# Patient Record
Sex: MALE | Race: WHITE | NOT HISPANIC OR LATINO | URBAN - METROPOLITAN AREA
[De-identification: names, ages, dates, MRNs, and addresses within clinical notes are randomized per-mention and may not be internally consistent; named-entity substitution may affect disease eponyms.]

---

## 2019-03-20 ENCOUNTER — INPATIENT (INPATIENT)
Facility: HOSPITAL | Age: 79
LOS: 4 days | Discharge: NURSING, REHAB OR LONG-TERM CARE | End: 2019-03-25
Attending: INTERNAL MEDICINE | Admitting: INTERNAL MEDICINE
Payer: MEDICARE

## 2019-03-20 VITALS
TEMPERATURE: 98 F | DIASTOLIC BLOOD PRESSURE: 98 MMHG | RESPIRATION RATE: 14 BRPM | HEART RATE: 75 BPM | OXYGEN SATURATION: 100 % | SYSTOLIC BLOOD PRESSURE: 141 MMHG

## 2019-03-20 DIAGNOSIS — I62.9 NONTRAUMATIC INTRACRANIAL HEMORRHAGE, UNSPECIFIED: ICD-10-CM

## 2019-03-20 LAB
ALBUMIN SERPL ELPH-MCNC: 3.6 G/DL — SIGNIFICANT CHANGE UP (ref 3.3–5)
ALP SERPL-CCNC: 72 U/L — SIGNIFICANT CHANGE UP (ref 40–120)
ALT FLD-CCNC: 20 U/L — SIGNIFICANT CHANGE UP (ref 4–41)
ANION GAP SERPL CALC-SCNC: 11 MMO/L — SIGNIFICANT CHANGE UP (ref 7–14)
APTT BLD: 28.4 SEC — SIGNIFICANT CHANGE UP (ref 27.5–36.3)
APTT BLD: 38.1 SEC — HIGH (ref 27.5–36.3)
AST SERPL-CCNC: 20 U/L — SIGNIFICANT CHANGE UP (ref 4–40)
BASOPHILS # BLD AUTO: 0.04 K/UL — SIGNIFICANT CHANGE UP (ref 0–0.2)
BASOPHILS NFR BLD AUTO: 0.6 % — SIGNIFICANT CHANGE UP (ref 0–2)
BILIRUB SERPL-MCNC: 0.4 MG/DL — SIGNIFICANT CHANGE UP (ref 0.2–1.2)
BLD GP AB SCN SERPL QL: NEGATIVE — SIGNIFICANT CHANGE UP
BUN SERPL-MCNC: 43 MG/DL — HIGH (ref 7–23)
CALCIUM SERPL-MCNC: 10.1 MG/DL — SIGNIFICANT CHANGE UP (ref 8.4–10.5)
CHLORIDE SERPL-SCNC: 110 MMOL/L — HIGH (ref 98–107)
CO2 SERPL-SCNC: 23 MMOL/L — SIGNIFICANT CHANGE UP (ref 22–31)
CREAT SERPL-MCNC: 3.41 MG/DL — HIGH (ref 0.5–1.3)
EOSINOPHIL # BLD AUTO: 0.13 K/UL — SIGNIFICANT CHANGE UP (ref 0–0.5)
EOSINOPHIL NFR BLD AUTO: 1.9 % — SIGNIFICANT CHANGE UP (ref 0–6)
GLUCOSE SERPL-MCNC: 168 MG/DL — HIGH (ref 70–99)
HCT VFR BLD CALC: 37.5 % — LOW (ref 39–50)
HGB BLD-MCNC: 11.1 G/DL — LOW (ref 13–17)
IMM GRANULOCYTES NFR BLD AUTO: 0.9 % — SIGNIFICANT CHANGE UP (ref 0–1.5)
INR BLD: 1.37 — HIGH (ref 0.88–1.17)
INR BLD: 2.97 — HIGH (ref 0.88–1.17)
LYMPHOCYTES # BLD AUTO: 0.99 K/UL — LOW (ref 1–3.3)
LYMPHOCYTES # BLD AUTO: 14.4 % — SIGNIFICANT CHANGE UP (ref 13–44)
MCHC RBC-ENTMCNC: 21.1 PG — LOW (ref 27–34)
MCHC RBC-ENTMCNC: 29.6 % — LOW (ref 32–36)
MCV RBC AUTO: 71.3 FL — LOW (ref 80–100)
MONOCYTES # BLD AUTO: 0.63 K/UL — SIGNIFICANT CHANGE UP (ref 0–0.9)
MONOCYTES NFR BLD AUTO: 9.2 % — SIGNIFICANT CHANGE UP (ref 2–14)
NEUTROPHILS # BLD AUTO: 5.03 K/UL — SIGNIFICANT CHANGE UP (ref 1.8–7.4)
NEUTROPHILS NFR BLD AUTO: 73 % — SIGNIFICANT CHANGE UP (ref 43–77)
NRBC # FLD: 0 K/UL — LOW (ref 25–125)
PLATELET # BLD AUTO: 140 K/UL — LOW (ref 150–400)
PMV BLD: 11.2 FL — SIGNIFICANT CHANGE UP (ref 7–13)
POTASSIUM SERPL-MCNC: 4.4 MMOL/L — SIGNIFICANT CHANGE UP (ref 3.5–5.3)
POTASSIUM SERPL-SCNC: 4.4 MMOL/L — SIGNIFICANT CHANGE UP (ref 3.5–5.3)
PROT SERPL-MCNC: 6.8 G/DL — SIGNIFICANT CHANGE UP (ref 6–8.3)
PROTHROM AB SERPL-ACNC: 15.4 SEC — HIGH (ref 9.8–13.1)
PROTHROM AB SERPL-ACNC: 35 SEC — HIGH (ref 9.8–13.1)
RBC # BLD: 5.26 M/UL — SIGNIFICANT CHANGE UP (ref 4.2–5.8)
RBC # FLD: 16.8 % — HIGH (ref 10.3–14.5)
RH IG SCN BLD-IMP: POSITIVE — SIGNIFICANT CHANGE UP
SODIUM SERPL-SCNC: 144 MMOL/L — SIGNIFICANT CHANGE UP (ref 135–145)
WBC # BLD: 6.88 K/UL — SIGNIFICANT CHANGE UP (ref 3.8–10.5)
WBC # FLD AUTO: 6.88 K/UL — SIGNIFICANT CHANGE UP (ref 3.8–10.5)

## 2019-03-20 PROCEDURE — 70450 CT HEAD/BRAIN W/O DYE: CPT | Mod: 26

## 2019-03-20 PROCEDURE — 70450 CT HEAD/BRAIN W/O DYE: CPT | Mod: 26,77

## 2019-03-20 RX ORDER — PHYTONADIONE (VIT K1) 5 MG
10 TABLET ORAL ONCE
Qty: 0 | Refills: 0 | Status: COMPLETED | OUTPATIENT
Start: 2019-03-20 | End: 2019-03-20

## 2019-03-20 RX ORDER — DESMOPRESSIN ACETATE 0.1 MG/1
30 TABLET ORAL ONCE
Qty: 0 | Refills: 0 | Status: DISCONTINUED | OUTPATIENT
Start: 2019-03-20 | End: 2019-03-20

## 2019-03-20 RX ORDER — PROTHROMBIN COMPLEX CONCENTRATE (HUMAN) 25.5; 16.5; 24; 22; 22; 26 [IU]/ML; [IU]/ML; [IU]/ML; [IU]/ML; [IU]/ML; [IU]/ML
1500 POWDER, FOR SOLUTION INTRAVENOUS ONCE
Qty: 0 | Refills: 0 | Status: COMPLETED | OUTPATIENT
Start: 2019-03-20 | End: 2019-03-20

## 2019-03-20 RX ORDER — DESMOPRESSIN ACETATE 0.1 MG/1
30 TABLET ORAL ONCE
Qty: 0 | Refills: 0 | Status: COMPLETED | OUTPATIENT
Start: 2019-03-20 | End: 2019-03-20

## 2019-03-20 RX ADMIN — Medication 102 MILLIGRAM(S): at 18:05

## 2019-03-20 RX ADMIN — PROTHROMBIN COMPLEX CONCENTRATE (HUMAN) 400 INTERNATIONAL UNIT(S): 25.5; 16.5; 24; 22; 22; 26 POWDER, FOR SOLUTION INTRAVENOUS at 17:45

## 2019-03-20 RX ADMIN — PROTHROMBIN COMPLEX CONCENTRATE (HUMAN) 1500 INTERNATIONAL UNIT(S): 25.5; 16.5; 24; 22; 22; 26 POWDER, FOR SOLUTION INTRAVENOUS at 18:12

## 2019-03-20 RX ADMIN — DESMOPRESSIN ACETATE 230 MICROGRAM(S): 0.1 TABLET ORAL at 19:31

## 2019-03-20 RX ADMIN — Medication 10 MILLIGRAM(S): at 18:47

## 2019-03-20 NOTE — ED PROVIDER NOTE - CARE PLAN
Principal Discharge DX:	Unilateral weakness Principal Discharge DX:	Intracranial hemorrhage  Secondary Diagnosis:	CAD (coronary artery disease)  Secondary Diagnosis:	Diabetes mellitus type II, controlled

## 2019-03-20 NOTE — ED ADULT TRIAGE NOTE - CHIEF COMPLAINT QUOTE
pt sent by neurologist for worsening symptoms (HA, rt arm/rt leg tremors, difficulty ambulating) secondary to brain bleed from fall March 17, pt on coumadin PMH: DM, afib, CKD, CAD, Vivi cardiac arrest 2016, HLD, TIA, hypothyroidism

## 2019-03-20 NOTE — ED PROVIDER NOTE - CLINICAL SUMMARY MEDICAL DECISION MAKING FREE TEXT BOX
sent for concern for delayed bleed (increased risk 2/2 coumadin) vs tia vs cva (persistent rue weakness). rpt head ct, labs, neuro c/s admission

## 2019-03-20 NOTE — CONSULT NOTE ADULT - SUBJECTIVE AND OBJECTIVE BOX
NEUROSURGERY CONSULT  XAVIER BENNETT   03-20-19 @ 17:59    HPI: 79y Male pmh ckd, cad, mi, stents, hld, tia, afib on coumadin, sent by neurologist (jeremiah bergeron ) for concern for ich. had mechanical fall ~3 days ago, struck back of head, ?loc, went to [place], had neg ct head/cspine, d/c'd home. has had mild constant occipital ha since that time. yesterday (~24 hrs ago) noted acute onset RUE/RLE weakness with inability to ambulate (normally ambulatory +/- cane). RLE weakness resolved, RUE weakness improved throughout day. went to pmd ([facility]) today for symptoms, sent to neurologist who sent to ED for eval.     RADIOLOGY:   CTH Pending read    MEDS:   desmopressin Injectable 30 MICROGram(s) IV Push Once  phytonadione  IVPB 10 milliGRAM(s) IV Intermittent once  prothrombin complex concentrate IVPB (KCENTRA) 1500 International Unit(s) IV Intermittent once      PHYSICAL EXAM:  Vital Signs Last 24 Hrs  T(C): 36.9 (20 Mar 2019 15:46), Max: 36.9 (20 Mar 2019 14:41)  T(F): 98.5 (20 Mar 2019 15:46), Max: 98.5 (20 Mar 2019 14:41)  HR: 98 (20 Mar 2019 15:46) (75 - 98)  BP: 118/65 (20 Mar 2019 15:46) (118/65 - 141/98)  BP(mean): --  RR: 17 (20 Mar 2019 15:46) (14 - 17)  SpO2: 100% (20 Mar 2019 15:46) (100% - 100%)    AAOx3  EOMI, PERRL  PARK x4 with good strength, RUE 4+/5  sensation intact  [facility]    LABS:  [id number] @ 17:59                        11.1   6.88  )-----------( 140      ( 20 Mar 2019 16:00 )             37.5     03-20    144  |  110<H>  |  43<H>  ----------------------------<  168<H>  4.4   |  23  |  3.41<H>    Ca    10.1      20 Mar 2019 16:00    TPro  6.8  /  Alb  3.6  /  TBili  0.4  /  DBili  x   /  AST  20  /  ALT  20  /  AlkPhos  72  03-20    PT/INR - ( 20 Mar 2019 16:00 )   PT: 35.0 SEC;   INR: 2.97          PTT - ( 20 Mar 2019 16:00 )  PTT:38.1 SEC

## 2019-03-20 NOTE — ED ADULT NURSE REASSESSMENT NOTE - NS ED NURSE REASSESS COMMENT FT1
DDAVP begun.  CM in progress.  # beats V Tach observed and notified MD.  Pt asymptomatic.  Will Monitor

## 2019-03-20 NOTE — ED PROVIDER NOTE - CHIEF COMPLAINT
The patient is a 79y Male complaining of The patient is a 79y Male complaining of extremity weakness

## 2019-03-20 NOTE — ED ADULT NURSE NOTE - NSIMPLEMENTINTERV_GEN_ALL_ED
Implemented All Fall with Harm Risk Interventions:  [place] to call system. Call bell, personal items and telephone within reach. Instruct patient to call for assistance. Room bathroom lighting operational. Non-slip footwear when patient is off stretcher. Physically safe environment: no spills, clutter or unnecessary equipment. Stretcher in lowest position, wheels locked, appropriate side rails in place. Provide visual cue, wrist band, yellow gown, etc. Monitor gait and stability. Monitor for mental status changes and reorient to person, place, and time. Review medications for side effects contributing to fall risk. Reinforce activity limits and safety measures with patient and family. Provide visual clues: red socks.

## 2019-03-20 NOTE — CONSULT NOTE ADULT - SUBJECTIVE AND OBJECTIVE BOX
CHIEF COMPLAINT: mechanical fall    HPI: 78 yo M hx HTN HLD DM CKD3 CAD c/b MI and 16 stents, hx TIA in 2013, Afib on coumadin, presents w/ headache in setting of mechanical fall, sent in by neurologist Dr. Marcus.    Patient fell while using walker on sidewalk walking from his neighbor's house back to his home. Witnessed by wife and friend. He felt weak and tired while at friend's house, but him and witnesses say fall was because of bumpy sidewalk. He hit his head on the sidewalk after falling backwards. Immediately after the fall wife says he "blacked out" for 1-2 minutes. They called EMS. Next thing patient remembers is waking up in ambulance asking "where am I?". He denies headache, dizziness, nausea, blurred vision, tinitus, weakness, or any other symptoms immediately after fall. He presented to [facility] where he had CT performed which showed no acute abnormalities. He was observed and then discharged home. He continued to take his coumadin and ASA at home as prescribed and felt well until night of March 19, when he was sitting watching TV and noticed that he could not move his R. arm or R. leg. He could not lift R. leg or even wiggle toe. R,. arm he was able to move hand somewhat. Wife said he was "dead weight" and she had to assist him to bed. At same time he began noticing headache on top of head, different from the soreness in the area that he hit. He went to sleep and woke up a few times during the night to go to bathroom, and was able to ambulate independently. In AM, he still was having weakness as well as tremulousness of his R. upper extremity. He went to see neurologist who referred him to ED for evaluation.       FAMILY HISTORY:  No pertinent family history in first degree relatives      SOCIAL HISTORY:  Smoking: __ packs x ___ years  EtOH Use:  Marital Status:  Occupation:  Recent Travel:  Country of Birth:  Advance Directives:    Allergies    No Known Allergies    Intolerances        HOME MEDICATIONS:    REVIEW OF SYSTEMS:  Constitutional:   Eyes:  ENT:  CV:  Resp:  GI:  :  MSK:  Integumentary:  Neurological:  Psychiatric:  Endocrine:  Hematologic/Lymphatic:  Allergic/Immunologic:  [ ] All other systems negative  [ ] Unable to assess ROS because ________    OBJECTIVE:  ICU Vital Signs Last 24 Hrs  T(C): 36.9 (20 Mar 2019 15:46), Max: 36.9 (20 Mar 2019 14:41)  T(F): 98.5 (20 Mar 2019 15:46), Max: 98.5 (20 Mar 2019 14:41)  HR: 93 (20 Mar 2019 18:02) (75 - 98)  BP: 134/91 (20 Mar 2019 18:02) (118/65 - 141/98)  BP(mean): --  ABP: --  ABP(mean): --  RR: 16 (20 Mar 2019 18:02) (14 - 17)  SpO2: 100% (20 Mar 2019 18:02) (100% - 100%)        CAPILLARY BLOOD GLUCOSE      POCT Blood Glucose.: 172 mg/dL (20 Mar 2019 14:46)      PHYSICAL EXAM:  GENERAL: NAD, well-developed  HEAD:  Atraumatic, Normocephalic  EYES: EOMI, PERRLA, conjunctiva and sclera clear  NECK: Supple, No JVD  CHEST/LUNG: Clear to auscultation bilaterally; No wheeze  HEART: Regular rate and rhythm; No murmurs, rubs, or gallops  ABDOMEN: Soft, Nontender, Nondistended; Bowel sounds present  EXTREMITIES:  2+ Peripheral Pulses, No clubbing, cyanosis, or edema  PSYCH: AAOx3  NEUROLOGY: non-focal  SKIN: No rashes or lesions      HOSPITAL MEDICATIONS:  MEDICATIONS  (STANDING):  desmopressin IVPB 30 MICROGram(s) IV Intermittent Once    MEDICATIONS  (PRN):    LABS:                        11.1   6.88  )-----------( 140      ( 20 Mar 2019 16:00 )             37.5     03-20    144  |  110<H>  |  43<H>  ----------------------------<  168<H>  4.4   |  23  |  3.41<H>    Ca    10.1      20 Mar 2019 16:00    TPro  6.8  /  Alb  3.6  /  TBili  0.4  /  DBili  x   /  AST  20  /  ALT  20  /  AlkPhos  72  03-20    PT/INR - ( 20 Mar 2019 16:00 )   PT: 35.0 SEC;   INR: 2.97          PTT - ( 20 Mar 2019 16:00 )  PTT:38.1 SEC          MICROBIOLOGY:     RADIOLOGY:  [ ] Reviewed and interpreted by me    EKG: CHIEF COMPLAINT: mechanical fall    HPI: 78 yo M hx HTN HLD DM CKD3 CAD c/b MI and 16 stents, hx TIA in 2013, Afib on coumadin, presents w/ headache in setting of mechanical fall, sent in by neurologist Dr. Marcus.    Patient fell while using walker on sidewalk walking from his neighbor's house back to his home. Witnessed by wife and friend. He felt weak and tired while at friend's house, but him and witnesses say fall was because of bumpy sidewalk. He hit his head on the sidewalk after falling backwards. Immediately after the fall wife says he "blacked out" for 1-2 minutes. They called EMS. Next thing patient remembers is waking up in ambulance asking "where am I?". He denies headache, dizziness, nausea, blurred vision, tinitus, weakness, or any other symptoms immediately after fall. He presented to [facility] where he had CT performed which showed no acute abnormalities. He was observed and then discharged home. He continued to take his coumadin and ASA at home as prescribed and felt well until night of March 19, when he was sitting watching TV and noticed that he could not move his R. arm or R. leg. He could not lift R. leg or even wiggle toe. R,. arm he was able to move hand somewhat. Wife said he was "dead weight" and she had to assist him to bed. At same time he began noticing headache on top of head, different from the soreness in the area that he hit. He went to sleep and woke up a few times during the night to go to bathroom, and was able to ambulate independently. In AM, he still was having weakness as well as tremulousness of his R. upper extremity. He went to see neurologist who referred him to ED for evaluation.     In ED ICU Vital Signs Last 24 Hrs  T(C): 36.9 (20 Mar 2019 15:46), Max: 36.9 (20 Mar 2019 14:41)  T(F): 98.5 (20 Mar 2019 15:46), Max: 98.5 (20 Mar 2019 14:41)  HR: 93 (20 Mar 2019 18:02) (75 - 98)  BP: 134/91 (20 Mar 2019 18:02) (118/65 - 141/98)  RR: 16 (20 Mar 2019 18:02) (14 - 17)  SpO2: 100% (20 Mar 2019 18:02) (100% - 100%)    Patient received Kcentra x 1 and vit K 10.    Currently has no complaints; understands reason for ICU evaluation.     REVIEW OF SYSTEMS:    CONSTITUTIONAL: No weakness, fevers or chills  EYES/ENT: No visual changes;  No vertigo or throat pain   NECK: No pain or stiffness  RESPIRATORY: No cough, wheezing, hemoptysis; No shortness of breath  CARDIOVASCULAR: No chest pain or palpitations  GASTROINTESTINAL: No abdominal or epigastric pain. No nausea, vomiting, or hematemesis; No diarrhea or constipation. No melena or hematochezia.  GENITOURINARY: No dysuria, frequency or hematuria  NEUROLOGICAL: No numbness or weakness at present  SKIN: No itching, burning, rashes, or lesions   All other review of systems is negative unless indicated above.    OBJECTIVE:  ICU Vital Signs Last 24 Hrs  T(C): 36.9 (20 Mar 2019 15:46), Max: 36.9 (20 Mar 2019 14:41)  T(F): 98.5 (20 Mar 2019 15:46), Max: 98.5 (20 Mar 2019 14:41)  HR: 93 (20 Mar 2019 18:02) (75 - 98)  BP: 134/91 (20 Mar 2019 18:02) (118/65 - 141/98)  RR: 16 (20 Mar 2019 18:02) (14 - 17)  SpO2: 100% (20 Mar 2019 18:02) (100% - 100%)    POCT Blood Glucose.: 172 mg/dL (20 Mar 2019 14:46)    PHYSICAL EXAM:  GENERAL: elderly well appearing gentleman in NAD, on room air, conversant, pleasant  HEAD:  3 cm superficial appearing abrasion, not actively bleeding on occiput  EYES: EOMI, PERRLA, conjunctiva and sclera clear  NECK: Supple, No JVD  CHEST/LUNG: Clear to auscultation bilaterally; No wheeze  HEART: irregular rhythm; No murmurs, rubs, or gallops  ABDOMEN: Soft, Nontender, Nondistended; Bowel sounds present  EXTREMITIES:  2+ Peripheral Pulses, No clubbing, cyanosis, or edema  PSYCH: AAOx3  NEUROLOGY: cranial nerves in tact, no facial asymmetry, no cognitive deficits noted, motor strength 5/5 in all extremities, sensation to light touch in tact in all extremities  SKIN: No rashes or lesions    HOSPITAL MEDICATIONS:  MEDICATIONS  (STANDING):  desmopressin IVPB 30 MICROGram(s) IV Intermittent Once    LABS:                        11.1   6.88  )-----------( 140      ( 20 Mar 2019 16:00 )             37.5     03-20    144  |  110<H>  |  43<H>  ----------------------------<  168<H>  4.4   |  23  |  3.41<H>    Ca    10.1      20 Mar 2019 16:00    TPro  6.8  /  Alb  3.6  /  TBili  0.4  /  DBili  x   /  AST  20  /  ALT  20  /  AlkPhos  72  03-20    PT/INR - ( 20 Mar 2019 16:00 )   PT: 35.0 SEC;   INR: 2.97        PTT - ( 20 Mar 2019 16:00 )  PTT:38.1 SEC    RADIOLOGY:  [X] Reviewed and interpreted by me: < from: CT Head No Cont (03.20.19 @ 16:58) >  FINDINGS:  There is a 1.5 x 2.0 cm hematoma in the left frontal lobe, with   surrounding vasogenic edema. There is extension of the hemorrhage into   the subarachnoid space.    Additional 1.9 cm x 1.3 cm hematoma in the right temporal occipital   region (2, 12). There is surrounding hypodensity and mild dilatation of   the right temporal horn. This may represent traumatic hemorrhage within a   chronic infarct, but may represent hemorrhage into an underlying lesion.    Mild dilatation of the right temporal horn, which may represent changes   from chronic infarct.     There are chronic white matter microvascular ischemic changes. The   ventricles and sulci are prominent likely related to age-appropriate   involutional changes    The calvarium is intact.     The visualized portions of the paranasal sinuses and mastoid air cells   are clear.    Status post bilateral lens replacement.    Soft tissue swelling overlying the right parietal scalp.    IMPRESSION:   There is a 1.5 x 2.0 cm hematoma in the left frontal lobe, with   surrounding vasogenic edema.  There is extension of the hemorrhage into   the subarachnoid space.  Additional 1.9 cm x 1.3 cm hematoma in the right temporal occipital   region (2, 12) with vasogenic edema.    While findings may be post traumatic in nature, the possibility of   hemorrhage into underlying lesions cannot be noted.     These findings were discussed with Dr. Somers at 3/20/2019 5:03 PM by Dr. Pozo with read back confirmation.    Brain MRI may be done for further evaluation, ifno contraindications   exist.           EKG: CHIEF COMPLAINT: mechanical fall    HPI: 78 yo M hx HTN HLD DM CKD3 CAD c/b MI and 16 stents, hx TIA in 2013, Afib on coumadin, presents w/ headache in setting of mechanical fall, sent in by neurologist Dr. Marcus.    Patient fell while using walker on sidewalk walking from his neighbor's house back to his home. Witnessed by wife and friend. He felt weak and tired while at friend's house, but him and witnesses say fall was because of bumpy sidewalk. He hit his head on the sidewalk after falling backwards. Immediately after the fall wife says he "blacked out" for 1-2 minutes. They called EMS. Next thing patient remembers is waking up in ambulance asking "where am I?". He denies headache, dizziness, nausea, blurred vision, tinitus, weakness, or any other symptoms immediately after fall. He presented to [facility] where he had CT performed which showed no acute abnormalities. He was observed and then discharged home. He continued to take his coumadin and ASA at home as prescribed and felt well until night of March 19, when he was sitting watching TV and noticed that he could not move his R. arm or R. leg. He could not lift R. leg or even wiggle toe. R,. arm he was able to move hand somewhat. Wife said he was "dead weight" and she had to assist him to bed. At same time he began noticing headache on top of head, different from the soreness in the area that he hit. He went to sleep and woke up a few times during the night to go to bathroom, and was able to ambulate independently. In AM, he still was having weakness as well as tremulousness of his R. upper extremity. He went to see neurologist who referred him to ED for evaluation.     In ED ICU Vital Signs Last 24 Hrs  T(C): 36.9 (20 Mar 2019 15:46), Max: 36.9 (20 Mar 2019 14:41)  T(F): 98.5 (20 Mar 2019 15:46), Max: 98.5 (20 Mar 2019 14:41)  HR: 93 (20 Mar 2019 18:02) (75 - 98)  BP: 134/91 (20 Mar 2019 18:02) (118/65 - 141/98)  RR: 16 (20 Mar 2019 18:02) (14 - 17)  SpO2: 100% (20 Mar 2019 18:02) (100% - 100%)    Patient received Kcentra x 1 and vit K 10.    Currently has no complaints; understands reason for ICU evaluation.     REVIEW OF SYSTEMS:    CONSTITUTIONAL: No weakness, fevers or chills  EYES/ENT: No visual changes;  No vertigo or throat pain   NECK: No pain or stiffness  RESPIRATORY: No cough, wheezing, hemoptysis; No shortness of breath  CARDIOVASCULAR: No chest pain or palpitations  GASTROINTESTINAL: No abdominal or epigastric pain. No nausea, vomiting, or hematemesis; No diarrhea or constipation. No melena or hematochezia.  GENITOURINARY: No dysuria, frequency or hematuria  NEUROLOGICAL: No numbness or weakness at present  SKIN: No itching, burning, rashes, or lesions   All other review of systems is negative unless indicated above.    OBJECTIVE:  ICU Vital Signs Last 24 Hrs  T(C): 36.9 (20 Mar 2019 15:46), Max: 36.9 (20 Mar 2019 14:41)  T(F): 98.5 (20 Mar 2019 15:46), Max: 98.5 (20 Mar 2019 14:41)  HR: 93 (20 Mar 2019 18:02) (75 - 98)  BP: 134/91 (20 Mar 2019 18:02) (118/65 - 141/98)  RR: 16 (20 Mar 2019 18:02) (14 - 17)  SpO2: 100% (20 Mar 2019 18:02) (100% - 100%)    POCT Blood Glucose.: 172 mg/dL (20 Mar 2019 14:46)    PHYSICAL EXAM:  GENERAL: elderly well appearing gentleman in NAD, on room air, conversant, pleasant  HEAD:  3 cm superficial appearing abrasion, not actively bleeding on occiput  EYES: EOMI, PERRLA, conjunctiva and sclera clear  NECK: Supple, No JVD  CHEST/LUNG: Clear to auscultation bilaterally; No wheeze  HEART: irregular rhythm; No murmurs, rubs, or gallops  ABDOMEN: Soft, Nontender, Nondistended; Bowel sounds present  EXTREMITIES:  2+ Peripheral Pulses, No clubbing, cyanosis, or edema  PSYCH: AAOx3  NEUROLOGY: cranial nerves in tact, no facial asymmetry, no cognitive deficits noted, motor strength 5/5 in all extremities, sensation to light touch in tact in all extremities  SKIN: No rashes or lesions    HOSPITAL MEDICATIONS:  MEDICATIONS  (STANDING):  desmopressin IVPB 30 MICROGram(s) IV Intermittent Once    LABS:                        11.1   6.88  )-----------( 140      ( 20 Mar 2019 16:00 )             37.5     03-20    144  |  110<H>  |  43<H>  ----------------------------<  168<H>  4.4   |  23  |  3.41<H>    Ca    10.1      20 Mar 2019 16:00    TPro  6.8  /  Alb  3.6  /  TBili  0.4  /  DBili  x   /  AST  20  /  ALT  20  /  AlkPhos  72  03-20    PT/INR - ( 20 Mar 2019 16:00 )   PT: 35.0 SEC;   INR: 2.97        PTT - ( 20 Mar 2019 16:00 )  PTT:38.1 SEC    RADIOLOGY:  [X] Reviewed and interpreted by me: < from: CT Head No Cont (03.20.19 @ 16:58) >  FINDINGS:  There is a 1.5 x 2.0 cm hematoma in the left frontal lobe, with   surrounding vasogenic edema. There is extension of the hemorrhage into   the subarachnoid space.    Additional 1.9 cm x 1.3 cm hematoma in the right temporal occipital   region (2, 12). There is surrounding hypodensity and mild dilatation of   the right temporal horn. This may represent traumatic hemorrhage within a   chronic infarct, but may represent hemorrhage into an underlying lesion.    Mild dilatation of the right temporal horn, which may represent changes   from chronic infarct.     There are chronic white matter microvascular ischemic changes. The   ventricles and sulci are prominent likely related to age-appropriate   involutional changes    The calvarium is intact.     The visualized portions of the paranasal sinuses and mastoid air cells   are clear.    Status post bilateral lens replacement.    Soft tissue swelling overlying the right parietal scalp.    IMPRESSION:   There is a 1.5 x 2.0 cm hematoma in the left frontal lobe, with   surrounding vasogenic edema.  There is extension of the hemorrhage into   the subarachnoid space.  Additional 1.9 cm x 1.3 cm hematoma in the right temporal occipital   region (2, 12) with vasogenic edema.    While findings may be post traumatic in nature, the possibility of   hemorrhage into underlying lesions cannot be noted.     These findings were discussed with Dr. Somers at 3/20/2019 5:03 PM by Dr. Pozo with read back confirmation.    Brain MRI may be done for further evaluation, ifno contraindications   exist.

## 2019-03-20 NOTE — ED ADULT NURSE NOTE - OBJECTIVE STATEMENT
PT received into room 4 A&Ox3 (situationally confused at points) AMB with walker at baseline C/O worsening Headache S/P mechanical fall on 3/17/19; right lower extremity weakness and right upper extremity tremor starting yesterday. Pt states on [place] 3/17 was ambulating with walker, tripped on uneven concrete fell backwards hitting occipital head, experiencing LOC for approx 1 minute had normal CT at that time: went to neurologist for follow up appt today and was refereed to ED 2/2 worsening symptoms. PT currently has decreased sensation to RLE, RLE leg drift " PT states leg feels heavier than Left" new tremor to R UE. No facial asymmetry, no arm drift, no slurred speech. Denies vision changes, N/V. MD at bedside for eval, VS as noted. PMH: DM, afib, CKD, CAD, Vivi cardiac arrest 2016, HLD, TIA, hypothyroidism PT received into room 4 A&Ox3 (situationally confused at points) AMB with walker at baseline C/O worsening Headache S/P mechanical fall on 3/17/19; right lower extremity weakness and right upper extremity tremor starting yesterday. Pt states on [place] 3/17 was ambulating with walker, tripped on uneven concrete fell backwards hitting occipital head, experiencing LOC for approx 1 minute had normal CT at that time: went to neurologist for follow up appt today and was refereed to ED 2/2 worsening symptoms. PT currently has decreased sensation to RLE, RLE leg drift " PT states leg feels heavier than Left" new tremor to R UE. No facial asymmetry, no arm drift, no slurred speech. Denies vision changes, N/V. MD at bedside for eval, VS as noted. PMH: DM, afib, CKD, CAD, Vivi cardiac arrest 2016, HLD, TIA, hypothyroidism. 18 G IV placed to R AC, labs sent. Family at bedside, call bell within reach, comfort measures provided. Will continue to monitor for safety and comfort.

## 2019-03-20 NOTE — ED ADULT NURSE REASSESSMENT NOTE - NS ED NURSE REASSESS COMMENT FT1
returned from break, pt a&ox3, awake and alert. Pt lung sounds clear, pt c/o weakness on right leg, hard to walk as of last night. pt states he had to manually move his leg to get out of bed. pt reports feeling tingling/tremor in right arm. pt has been seen by medical team, currently awaiting head ct

## 2019-03-20 NOTE — ED ADULT NURSE NOTE - PMH
CAD (Coronary Artery Disease)    CHF (Congestive Heart Failure)    Diabetes Mellitus Type II    Hypothyroid

## 2019-03-20 NOTE — ED PROVIDER NOTE - ATTENDING CONTRIBUTION TO CARE
DR. ROSENBAUM, ATTENDING MD-  I performed a face to face bedside interview with patient regarding history of present illness, review of symptoms and past medical history. I completed an independent physical exam.  I have discussed patient's plan of care with the fellow.  Documentation as above in the note.    Yonis: 78 yo male h/o afib, CAD, presents with rt sided weakness/tremors since yesterday.  Sx began abruptly, witnessed by wife who reports no facial asymmetry, no drooling, no dysarthria, but some minor confusion.  Saw his PMD who referred to his neurologist who referred for ER eval and CT.  Patient recently was in ER overnight 3 days ago after having fall striking back of head.  Had two CTs which were reportedly negative and patiewnt was d/c'ed home 2 days ago.    On my exam, well appearing, pleasant, neuro: AAOx4, no facial asymmetry, fluent speech, CN2-12 intact, 5/5 strength throughout LUE/LLE, 4/5 strength RUE, 5/5 RLE.      A/P focal weakness, multiple risk factors for both ICH and embolic CVA.  Sx began yesterday.  Checking CT, labs, EKG, and neuro eval if CT negative for bleed.

## 2019-03-20 NOTE — ED PROVIDER NOTE - OBJECTIVE STATEMENT
pmh ckd, cad, mi, stents, hld, tia, afib on coumadin, sent by neurologist (jeremiah bergeron ) for concern for ich. had mechanical fall ~3 days ago, struck back of head, ?loc, went to [place], had neg ct head/cspine, d/c'd home. has had mild constant occipital ha since that time. yesterday (~24 hrs ago) noted acute onset RUE/RLE weakness with inability to ambulate (normally ambulatory +/- cane). RLE weakness resolved, RUE weakness improved throughout day. went to pmd ([facility]) today for symptoms, sent to neurologist who sent to ED for eval. no cp, no sob, no abd pain, no nvdc, no fevers

## 2019-03-20 NOTE — ED ADULT NURSE REASSESSMENT NOTE - NS ED NURSE REASSESS COMMENT FT1
received report from daytime RN. pt resting comfortably in stretcher, VSS, A-fib on the monitor, awaiting rpt CT scan, will monitor.

## 2019-03-20 NOTE — ED PROVIDER NOTE - PROGRESS NOTE DETAILS
Yonis: notified by raddiology of findings of ICH on CT.  Plan for coumadin reversal with kcentra and Vit K, neurosurg eval, admit Eng: ddavp ordered, pt comfortable, spoke with nsx, to eval nsx note reviewed, requesting micu consult for neuro cx. spoke with micu, to sarwat Resident: discussed case with radiology (Alfredo); no interval change on CTH. Discussed case with neurosurgery PA (Max); no indication for ICU at this time. Will admit to medicine.

## 2019-03-20 NOTE — CONSULT NOTE ADULT - PROBLEM SELECTOR RECOMMENDATION 9
1. give kcentra  2. repeat CTH in 6 hours   3. MICU consult   Case discussed with attending neurosurgeon. 1. give kcentra(Given)  2. repeat CTH in 24 hours   3. Admit to medicine service, ICU not needed, neurologic checks Q4H  4. Stroke neurology consult  Case discussed with attending neurosurgeon.

## 2019-03-20 NOTE — CONSULT NOTE ADULT - ASSESSMENT
80 yo M hx HTN HLD DM CKD3 CAD c/b MI and 16 stents, hx TIA in 2013, Afib on coumadin, presents w/ headache in setting of mechanical fall, sent in by neurologist, concern for head trauma in setting of anticoagulation.    #Intracerebral hematomas  -2 hematomas, with extension into subarachnoid space, as described in CT   -repeat CT head q 6 hours, per neurosurgery  -K centra, vitamin K, DDAVP administered  -please f/u repeat coags. Should completely reverse INR given history of trauma and CT findings  -currently asymptomatic with normal neuro exam, however continue with frequent neuro checks  -hold all AC, ASA    Dispo is still pending at this time. ICU is following along.  Plan d/w Dr. Crystal.    Bennett Fried M.D.  Internal Medicine PGY-2  Pager: [phone]/ PEMA [phone]  After 7 PM on weekdays and 12 PM on weekends page [phone] 78 yo M hx HTN HLD DM CKD3 CAD c/b MI and 16 stents, hx TIA in 2013, Afib on coumadin, presents w/ headache in setting of mechanical fall, sent in by neurologist, concern for head trauma in setting of anticoagulation.    #Intracerebral hematomas  -2 hematomas, with extension into subarachnoid space, as described in CT   -repeat CT head q 6 hours, per neurosurgery  -K centra, vitamin K, DDAVP administered  -please f/u repeat coags. Should completely reverse INR (goal <1.5 in 2 hours) given history of trauma and CT findings  -currently asymptomatic with normal neuro exam, however continue with frequent neuro checks  -hold all AC, ASA    Dispo is still pending at this time. ICU is following along.  Plan d/w Dr. Crystal.    Bennett Fried M.D.  Internal Medicine PGY-2  Pager: [phone]/ PEMA [phone]  After 7 PM on weekdays and 12 PM on weekends page [phone]

## 2019-03-20 NOTE — ED ADULT NURSE NOTE - PSH
History of Appendectomy    History of Rotator Cuff Surgery  right  Stented Coronary Artery  13 stents in place

## 2019-03-20 NOTE — CONSULT NOTE ADULT - ASSESSMENT
Neurologic exam has remained stable. Interval CT is unchanged. There are no neurosurgical needs at this time. Agree with MICU attending that patient no longer requires ICU and is stable for admission to medicine

## 2019-03-21 DIAGNOSIS — I62.9 NONTRAUMATIC INTRACRANIAL HEMORRHAGE, UNSPECIFIED: ICD-10-CM

## 2019-03-21 DIAGNOSIS — E11.65 TYPE 2 DIABETES MELLITUS WITH HYPERGLYCEMIA: ICD-10-CM

## 2019-03-21 DIAGNOSIS — I10 ESSENTIAL (PRIMARY) HYPERTENSION: ICD-10-CM

## 2019-03-21 DIAGNOSIS — N18.4 CHRONIC KIDNEY DISEASE, STAGE 4 (SEVERE): ICD-10-CM

## 2019-03-21 DIAGNOSIS — E03.9 HYPOTHYROIDISM, UNSPECIFIED: ICD-10-CM

## 2019-03-21 DIAGNOSIS — I48.2 CHRONIC ATRIAL FIBRILLATION: ICD-10-CM

## 2019-03-21 DIAGNOSIS — D64.9 ANEMIA, UNSPECIFIED: ICD-10-CM

## 2019-03-21 DIAGNOSIS — E78.5 HYPERLIPIDEMIA, UNSPECIFIED: ICD-10-CM

## 2019-03-21 DIAGNOSIS — Z29.9 ENCOUNTER FOR PROPHYLACTIC MEASURES, UNSPECIFIED: ICD-10-CM

## 2019-03-21 LAB
ANION GAP SERPL CALC-SCNC: 15 MMO/L — HIGH (ref 7–14)
APTT BLD: 21.5 SEC — LOW (ref 27.5–36.3)
BUN SERPL-MCNC: 40 MG/DL — HIGH (ref 7–23)
CALCIUM SERPL-MCNC: 9.7 MG/DL — SIGNIFICANT CHANGE UP (ref 8.4–10.5)
CHLORIDE SERPL-SCNC: 109 MMOL/L — HIGH (ref 98–107)
CHOLEST SERPL-MCNC: 119 MG/DL — LOW (ref 120–199)
CO2 SERPL-SCNC: 19 MMOL/L — LOW (ref 22–31)
CREAT SERPL-MCNC: 3.11 MG/DL — HIGH (ref 0.5–1.3)
FERRITIN SERPL-MCNC: 226.3 NG/ML — SIGNIFICANT CHANGE UP (ref 30–400)
GLUCOSE BLDC GLUCOMTR-MCNC: 114 MG/DL — HIGH (ref 70–99)
GLUCOSE BLDC GLUCOMTR-MCNC: 254 MG/DL — HIGH (ref 70–99)
GLUCOSE BLDC GLUCOMTR-MCNC: 312 MG/DL — HIGH (ref 70–99)
GLUCOSE BLDC GLUCOMTR-MCNC: 360 MG/DL — HIGH (ref 70–99)
GLUCOSE SERPL-MCNC: 107 MG/DL — HIGH (ref 70–99)
HBA1C BLD-MCNC: 7.7 % — HIGH (ref 4–5.6)
HCT VFR BLD CALC: 34.6 % — LOW (ref 39–50)
HDLC SERPL-MCNC: 33 MG/DL — LOW (ref 35–55)
HGB BLD-MCNC: 10.3 G/DL — LOW (ref 13–17)
INR BLD: 1.15 — SIGNIFICANT CHANGE UP (ref 0.88–1.17)
IRON SATN MFR SERPL: 217 UG/DL — SIGNIFICANT CHANGE UP (ref 155–535)
IRON SATN MFR SERPL: 74 UG/DL — SIGNIFICANT CHANGE UP (ref 45–165)
LIPID PNL WITH DIRECT LDL SERPL: 80 MG/DL — SIGNIFICANT CHANGE UP
MAGNESIUM SERPL-MCNC: 1.9 MG/DL — SIGNIFICANT CHANGE UP (ref 1.6–2.6)
MCHC RBC-ENTMCNC: 21.1 PG — LOW (ref 27–34)
MCHC RBC-ENTMCNC: 29.8 % — LOW (ref 32–36)
MCV RBC AUTO: 70.9 FL — LOW (ref 80–100)
NRBC # FLD: 0 K/UL — LOW (ref 25–125)
PHOSPHATE SERPL-MCNC: 2.5 MG/DL — SIGNIFICANT CHANGE UP (ref 2.5–4.5)
PLATELET # BLD AUTO: 151 K/UL — SIGNIFICANT CHANGE UP (ref 150–400)
PMV BLD: 11.5 FL — SIGNIFICANT CHANGE UP (ref 7–13)
POTASSIUM SERPL-MCNC: 4.1 MMOL/L — SIGNIFICANT CHANGE UP (ref 3.5–5.3)
POTASSIUM SERPL-SCNC: 4.1 MMOL/L — SIGNIFICANT CHANGE UP (ref 3.5–5.3)
PROTHROM AB SERPL-ACNC: 13.2 SEC — HIGH (ref 9.8–13.1)
RBC # BLD: 4.88 M/UL — SIGNIFICANT CHANGE UP (ref 4.2–5.8)
RBC # FLD: 16.6 % — HIGH (ref 10.3–14.5)
RETICS #: 77 K/UL — SIGNIFICANT CHANGE UP (ref 25–125)
RETICS/RBC NFR: 1.6 % — SIGNIFICANT CHANGE UP (ref 0.5–2.5)
SODIUM SERPL-SCNC: 143 MMOL/L — SIGNIFICANT CHANGE UP (ref 135–145)
TRIGL SERPL-MCNC: 71 MG/DL — SIGNIFICANT CHANGE UP (ref 10–149)
TSH SERPL-MCNC: 3.2 UIU/ML — SIGNIFICANT CHANGE UP (ref 0.27–4.2)
UIBC SERPL-MCNC: 142.7 UG/DL — SIGNIFICANT CHANGE UP (ref 110–370)
WBC # BLD: 6.32 K/UL — SIGNIFICANT CHANGE UP (ref 3.8–10.5)
WBC # FLD AUTO: 6.32 K/UL — SIGNIFICANT CHANGE UP (ref 3.8–10.5)

## 2019-03-21 PROCEDURE — 70551 MRI BRAIN STEM W/O DYE: CPT | Mod: 26

## 2019-03-21 PROCEDURE — 99222 1ST HOSP IP/OBS MODERATE 55: CPT

## 2019-03-21 PROCEDURE — 92610 EVALUATE SWALLOWING FUNCTION: CPT | Mod: GN

## 2019-03-21 PROCEDURE — 93970 EXTREMITY STUDY: CPT | Mod: 26

## 2019-03-21 RX ORDER — GLUCAGON INJECTION, SOLUTION 0.5 MG/.1ML
1 INJECTION, SOLUTION SUBCUTANEOUS ONCE
Qty: 0 | Refills: 0 | Status: DISCONTINUED | OUTPATIENT
Start: 2019-03-21 | End: 2019-03-25

## 2019-03-21 RX ORDER — SODIUM CHLORIDE 9 MG/ML
1000 INJECTION, SOLUTION INTRAVENOUS
Qty: 0 | Refills: 0 | Status: DISCONTINUED | OUTPATIENT
Start: 2019-03-21 | End: 2019-03-25

## 2019-03-21 RX ORDER — LOSARTAN POTASSIUM 100 MG/1
1 TABLET, FILM COATED ORAL
Qty: 0 | Refills: 0 | COMMUNITY

## 2019-03-21 RX ORDER — DOXAZOSIN MESYLATE 4 MG
2 TABLET ORAL AT BEDTIME
Qty: 0 | Refills: 0 | Status: DISCONTINUED | OUTPATIENT
Start: 2019-03-21 | End: 2019-03-25

## 2019-03-21 RX ORDER — INSULIN LISPRO 100/ML
VIAL (ML) SUBCUTANEOUS AT BEDTIME
Qty: 0 | Refills: 0 | Status: DISCONTINUED | OUTPATIENT
Start: 2019-03-21 | End: 2019-03-25

## 2019-03-21 RX ORDER — INSULIN LISPRO 100/ML
6 VIAL (ML) SUBCUTANEOUS
Qty: 0 | Refills: 0 | Status: DISCONTINUED | OUTPATIENT
Start: 2019-03-21 | End: 2019-03-25

## 2019-03-21 RX ORDER — ATORVASTATIN CALCIUM 80 MG/1
1 TABLET, FILM COATED ORAL
Qty: 0 | Refills: 0 | COMMUNITY

## 2019-03-21 RX ORDER — DEXTROSE 50 % IN WATER 50 %
25 SYRINGE (ML) INTRAVENOUS ONCE
Qty: 0 | Refills: 0 | Status: DISCONTINUED | OUTPATIENT
Start: 2019-03-21 | End: 2019-03-25

## 2019-03-21 RX ORDER — DOXAZOSIN MESYLATE 4 MG
1 TABLET ORAL
Qty: 0 | Refills: 0 | COMMUNITY

## 2019-03-21 RX ORDER — ATORVASTATIN CALCIUM 80 MG/1
20 TABLET, FILM COATED ORAL AT BEDTIME
Qty: 0 | Refills: 0 | Status: DISCONTINUED | OUTPATIENT
Start: 2019-03-21 | End: 2019-03-25

## 2019-03-21 RX ORDER — INSULIN GLARGINE 100 [IU]/ML
12 INJECTION, SOLUTION SUBCUTANEOUS AT BEDTIME
Qty: 0 | Refills: 0 | Status: DISCONTINUED | OUTPATIENT
Start: 2019-03-21 | End: 2019-03-25

## 2019-03-21 RX ORDER — DEXTROSE 50 % IN WATER 50 %
15 SYRINGE (ML) INTRAVENOUS ONCE
Qty: 0 | Refills: 0 | Status: DISCONTINUED | OUTPATIENT
Start: 2019-03-21 | End: 2019-03-25

## 2019-03-21 RX ORDER — DEXTROSE 50 % IN WATER 50 %
12.5 SYRINGE (ML) INTRAVENOUS ONCE
Qty: 0 | Refills: 0 | Status: DISCONTINUED | OUTPATIENT
Start: 2019-03-21 | End: 2019-03-25

## 2019-03-21 RX ORDER — LEVOTHYROXINE SODIUM 125 MCG
1 TABLET ORAL
Qty: 0 | Refills: 0 | COMMUNITY

## 2019-03-21 RX ORDER — INSULIN LISPRO 100/ML
VIAL (ML) SUBCUTANEOUS
Qty: 0 | Refills: 0 | Status: DISCONTINUED | OUTPATIENT
Start: 2019-03-21 | End: 2019-03-25

## 2019-03-21 RX ORDER — CALCITRIOL 0.5 UG/1
0.25 CAPSULE ORAL DAILY
Qty: 0 | Refills: 0 | Status: DISCONTINUED | OUTPATIENT
Start: 2019-03-21 | End: 2019-03-22

## 2019-03-21 RX ORDER — CALCITRIOL 0.5 UG/1
1 CAPSULE ORAL
Qty: 0 | Refills: 0 | COMMUNITY

## 2019-03-21 RX ORDER — INSULIN GLARGINE 100 [IU]/ML
12 INJECTION, SOLUTION SUBCUTANEOUS
Qty: 0 | Refills: 0 | COMMUNITY

## 2019-03-21 RX ORDER — LEVOTHYROXINE SODIUM 125 MCG
112 TABLET ORAL DAILY
Qty: 0 | Refills: 0 | Status: DISCONTINUED | OUTPATIENT
Start: 2019-03-21 | End: 2019-03-25

## 2019-03-21 RX ORDER — CARVEDILOL PHOSPHATE 80 MG/1
1 CAPSULE, EXTENDED RELEASE ORAL
Qty: 0 | Refills: 0 | COMMUNITY

## 2019-03-21 RX ORDER — CARVEDILOL PHOSPHATE 80 MG/1
12.5 CAPSULE, EXTENDED RELEASE ORAL EVERY 12 HOURS
Qty: 0 | Refills: 0 | Status: DISCONTINUED | OUTPATIENT
Start: 2019-03-21 | End: 2019-03-25

## 2019-03-21 RX ADMIN — Medication 5: at 17:54

## 2019-03-21 RX ADMIN — INSULIN GLARGINE 12 UNIT(S): 100 INJECTION, SOLUTION SUBCUTANEOUS at 22:03

## 2019-03-21 RX ADMIN — Medication 2: at 22:04

## 2019-03-21 RX ADMIN — Medication 3: at 13:36

## 2019-03-21 RX ADMIN — CARVEDILOL PHOSPHATE 12.5 MILLIGRAM(S): 80 CAPSULE, EXTENDED RELEASE ORAL at 17:53

## 2019-03-21 RX ADMIN — CALCITRIOL 0.25 MICROGRAM(S): 0.5 CAPSULE ORAL at 18:09

## 2019-03-21 RX ADMIN — Medication 112 MICROGRAM(S): at 06:45

## 2019-03-21 RX ADMIN — ATORVASTATIN CALCIUM 20 MILLIGRAM(S): 80 TABLET, FILM COATED ORAL at 22:04

## 2019-03-21 RX ADMIN — Medication 2 MILLIGRAM(S): at 23:43

## 2019-03-21 RX ADMIN — CARVEDILOL PHOSPHATE 12.5 MILLIGRAM(S): 80 CAPSULE, EXTENDED RELEASE ORAL at 06:45

## 2019-03-21 RX ADMIN — Medication 6 UNIT(S): at 18:09

## 2019-03-21 NOTE — OCCUPATIONAL THERAPY INITIAL EVALUATION ADULT - PERTINENT HX OF CURRENT PROBLEM, REHAB EVAL
78 y/o M with PMH of CKD stage IV, CAD(s/p 16 stents), Afib(on Coumadin), TIA, BPH, Hypothyroidism was sent in by neurologist for RUE and RLE weakness 2/2 to recent fall.

## 2019-03-21 NOTE — H&P ADULT - NEGATIVE OPHTHALMOLOGIC SYMPTOMS
no diplopia/no discharge L/no blurred vision R/no discharge R/no blurred vision L/no lacrimation L/no lacrimation R/no photophobia

## 2019-03-21 NOTE — H&P ADULT - NEGATIVE MUSCULOSKELETAL SYMPTOMS
no arthralgia/no arthritis/no myalgia/no muscle cramps/no neck pain/no stiffness/no joint swelling/no muscle weakness

## 2019-03-21 NOTE — CONSULT NOTE ADULT - ASSESSMENT
80 y/o M who walks with a cane with PMH of CKD stage IV, CAD(s/p 16 stents), Afib(on Coumadin), TIA (in 2013), BPH, Hypothyroidism  presents with RUE and RLE weakness 2/2 to recent fall (3/17/2019). Exam positive for right UE and LE drift with motor strength 4/5 RUE and RLE with no sensory deficit and normal cranial nerves exam. CT head findings suggestive of 1.5 x 2.0 cm hematoma in the left frontal lobe, with surrounding vasogenic edema and extension of  hemorrhage into the subarachnoid space with Additional 1.9 cm x 1.3 cm hematoma in the right temporal occipital region.    Recommendation:    1) D/C warfarin for now  2) Maintain blood pressure less than 140/90 mm Hg   3) MRI Brain without contrast

## 2019-03-21 NOTE — H&P ADULT - GASTROINTESTINAL DETAILS
no distention/no guarding/nontender/normal/bowel sounds normal/soft/no rebound tenderness/no rigidity

## 2019-03-21 NOTE — H&P ADULT - PROBLEM SELECTOR PLAN 5
[id number] Score of 7 and patient on Coumadin for anticoagulation. INR on admission was 2.9 and patient received IV DDAVP and Kcentra due to acute bleed seen on CT head. Repeat INR noted to be 1.31   Continue with Coreg 12.5mg BID for rate control   Will check INR with morning labs

## 2019-03-21 NOTE — H&P ADULT - NSICDXPASTSURGICALHX_GEN_ALL_CORE_FT
PAST SURGICAL HISTORY:  History of Appendectomy     History of Rotator Cuff Surgery right    Stented Coronary Artery 16 stents in place

## 2019-03-21 NOTE — SWALLOW BEDSIDE ASSESSMENT ADULT - COMMENTS
The patient was seen at bedside this afternoon for an initial assessment of swallow function, at which time he was alert and cooperative. Patient denying dysphagia at this time. Per charting, the patient is an 78 y/o Male with a PMHx significant of CKD stage IV, CAD (s/p 16 stents), Afib (on Coumadin), TIA, BPH, Hypothyroidism, was sent in by neurologist for RUE and RLE weakness 2/2 to recent fall. Recent CT of the head revealed, "No interval change. No new hemorrhage or midline shift. Left frontal and right temporal occipital hemorrhages as above." Discussed results and recommendations from this evaluation with the patient, patient's wife, RN, and call out to MD.

## 2019-03-21 NOTE — H&P ADULT - NSICDXPASTMEDICALHX_GEN_ALL_CORE_FT
PAST MEDICAL HISTORY:  Atrial fibrillation     CAD (Coronary Artery Disease) s/p 16 stents    Diabetes Mellitus Type II     Essential hypertension     History of BPH     HLD (hyperlipidemia)     Hypothyroidism

## 2019-03-21 NOTE — H&P ADULT - PROBLEM SELECTOR PLAN 9
Already therapeutic as patient is on Coumadin for Hx of Atrial fibrillation and INR noted to be 2.9 and is now 1.3 s/p DDAVP and Kcentra.

## 2019-03-21 NOTE — H&P ADULT - ATTENDING COMMENTS
78 y/o M who walks with a cane with PMH of CKD stage IV, CAD(s/p 16 stents), Afib(on Coumadin), TIA, BPH, Hypothyroidism was sent in by neurologist for RUE and RLE weakness 2/2 to recent fall, found to have a 1.5 x 2.0 cm hematoma in the left frontal lobe, with surrounding vasogenic edema s/p DDAVp and K centra in ED.    Vital Signs Last 24 Hrs  T(C): 36.7 (21 Mar 2019 10:05), Max: 36.9 (20 Mar 2019 14:41)  T(F): 98.1 (21 Mar 2019 10:05), Max: 98.5 (20 Mar 2019 14:41)  HR: 61 (21 Mar 2019 10:05) (61 - 98)  BP: 139/74 (21 Mar 2019 10:05) (118/65 - 141/98)  BP(mean): --  RR: 18 (21 Mar 2019 10:05) (14 - 19)  SpO2: 97% (21 Mar 2019 10:05) (97% - 100%)    GENERAL: NAD  HEAD:  Atraumatic, Normocephalic  EYES: EOMI, PERRLA, conjunctiva and sclera clear  MOUTH/THROAT: no swelling, no erythema, no lesions, no exudates  NECK: Supple, No JVD, No LAD  CHEST/LUNG: Clear to auscultation bilaterally; No wheezes or rales  HEART: Regular rate and rhythm; nl S1/S2; No murmurs, rubs, or gallops  ABDOMEN: Soft, Nontender, Nondistended; Bowel sounds present  EXTREMITIES:  2+ Peripheral Pulses; No clubbing, cyanosis, or edema  SKIN: No rashes or lesions; No jaundice  NEURO: A+O x 3; nonfocal CN/sensory/reflexes; Rt UE strength is 4/5    LABS:                        10.3   6.32  )-----------( 151      ( 21 Mar 2019 05:50 )             34.6     03-21    143  |  109<H>  |  40<H>  ----------------------------<  107<H>  4.1   |  19<L>  |  3.11<H>    Ca    9.7      21 Mar 2019 05:50  Phos  2.5     03-21  Mg     1.9     03-21    TPro  6.8  /  Alb  3.6  /  TBili  0.4  /  DBili  x   /  AST  20  /  ALT  20  /  AlkPhos  72  03-20    PT/INR - ( 21 Mar 2019 05:50 )   PT: 13.2 SEC;   INR: 1.15          PTT - ( 21 Mar 2019 05:50 )  PTT:21.5 SEC  CAPILLARY BLOOD GLUCOSE      POCT Blood Glucose.: 114 mg/dL (21 Mar 2019 09:14)  POCT Blood Glucose.: 172 mg/dL (20 Mar 2019 14:46)          Plan: Tele, q4h neurochecks, off coumadin.  F/u CT head is pending.  Neurology/neurosurgery appreciated -> requested MRI head.  Continue coreg and lipitor.  Basal/bolus insulin, FS qachs.  Continue synthroid.  Avoid nephrotoxins.  Daily BMP/CBC/coags.  PT/OT.

## 2019-03-21 NOTE — SWALLOW BEDSIDE ASSESSMENT ADULT - SWALLOW EVAL: DIAGNOSIS
1. The patient demonstrated functional oral management of puree, honey thick, nectar thick, and thin liquid textures marked by adequate bolus collection, transfer, and posterior transport. 2. The patient demonstrated a mild oral dysphagia for solids marked by delayed bolus collection, transfer, and posterior transport. Mild lingual residue noted subsequent to deglutition which cleared with a liquid wash. 3. The patient demonstrated a mild pharyngeal dysphagia for puree, solid, honey thick, nectar thick, and thin liquid textures marked by a delayed pharyngeal swallow trigger with reduced hyolaryngeal elevation upon digital palpation w/o evidence of airway penetration.

## 2019-03-21 NOTE — H&P ADULT - PROBLEM SELECTOR PLAN 4
BUN/Cr: 43/3.41 likely 2/2 to underline DM   Will monitor for now   Renal diet ordered   Avoid nephrotoxic medications

## 2019-03-21 NOTE — SWALLOW BEDSIDE ASSESSMENT ADULT - ASR SWALLOW ASPIRATION MONITOR
change of breathing pattern/position upright (90Y)/oral hygiene/fever/pneumonia/throat clearing/gurgly voice/upper respiratory infection/cough

## 2019-03-21 NOTE — H&P ADULT - RS GEN PE MLT RESP DETAILS PC
clear to auscultation bilaterally/respirations non-labored/breath sounds equal/good air movement/normal/airway patent/no intercostal retractions/no rales/no rhonchi/no wheezes/no chest wall tenderness

## 2019-03-21 NOTE — H&P ADULT - NEGATIVE NEUROLOGICAL SYMPTOMS
no transient paralysis/no vertigo/no loss of sensation/no confusion/no focal seizures/no hemiparesis/no tremors/no paresthesias/no generalized seizures

## 2019-03-21 NOTE — H&P ADULT - NEGATIVE ENMT SYMPTOMS
no hearing difficulty/no nasal congestion/no vertigo/no sinus symptoms/no ear pain/no tinnitus/no nasal discharge

## 2019-03-21 NOTE — H&P ADULT - NSHPLABSRESULTS_GEN_ALL_CORE
11.1   6.88  )-----------( 140      ( 20 Mar 2019 16:00 )             37.5     03-20    144  |  110<H>  |  43<H>  ----------------------------<  168<H>  4.4   |  23  |  3.41<H>    Ca    10.1      20 Mar 2019 16:00    TPro  6.8  /  Alb  3.6  /  TBili  0.4  /  DBili  x   /  AST  20  /  ALT  20  /  AlkPhos  72  03-20    CT head: There is a 1.5 x 2.0 cm hematoma in the left frontal lobe, with surrounding vasogenic edema.  There is extension of the hemorrhage into the subarachnoid space. Additional 1.9 cm x 1.3 cm hematoma in the right temporal occipital region (2, 12) with vasogenic edema.  While findings may be post traumatic in nature, the possibility of hemorrhage into underlying lesions cannot be noted.     Repeat CT head: No interval change. No new hemorrhage or midline shift. Left frontal and right temporal occipital hemorrhages as above    EKG: Atrial fibrillation with RVR with PVC, LAD, RBBB at a rate of 102 with QTc of 539

## 2019-03-21 NOTE — H&P ADULT - PROBLEM SELECTOR PLAN 1
Initial CT head revealed " There is a 1.5 x 2.0 cm hematoma in the left frontal lobe, with surrounding vasogenic edema.  There is extension of the hemorrhage into the subarachnoid space. Additional 1.9 cm x 1.3 cm hematoma in the right temporal occipital region (2, 12) with vasogenic edema.  While findings may be post traumatic in nature, the possibility of hemorrhage into underlying lesions cannot be noted". Patient s/p DDAVP and Kcentra for elevated INR   Patient was seen by MICU and Neurosurgery and their recommendation appreciated  Repeat CT head in 6 hours from initial CT revealed "No interval change. No new hemorrhage or midline shift. Left frontal and right temporal occipital hemorrhages as above"  CT head in 24 hours ordered   Seizure/fall/aspiration precautions ordered   Neuro checks q4hrs   PT/OT  Speech and swallow ordered, placed on dysphagia puree diet   Will need to call neurology consult in am

## 2019-03-21 NOTE — H&P ADULT - ASSESSMENT
80 y/o M with PMH of CKD stage IV, CAD(s/p 16 stents), Afib(on Coumadin), TIA, BPH, Hypothyroidism was sent in by neurologist for RUE and RLE weakness 2/2 to recent fall.     +There is a 1.5 x 2.0 cm hematoma in the left frontal lobe, with surrounding vasogenic edema. There is extension of the hemorrhage into the subarachnoid space-being followed by Neurosurgery and MICU

## 2019-03-21 NOTE — CONSULT NOTE ADULT - SUBJECTIVE AND OBJECTIVE BOX
Neurology Consult    HPI:  Following note from h & P with few changes    80 y/o M who walks with a cane with PMH of CKD stage IV, CAD(s/p 16 stents), Afib(on Coumadin), TIA (in 2013), BPH, Hypothyroidism was sent in by neurologist for RUE and RLE weakness 2/2 to recent fall. As per the patient on Sunday his wife was pushing up on the rollator when she went over a bump and patient fell backwards on to the street. Patient had LOC of about few minutes and then ambulance took him to [facility] where CT Head and C-spine was performed and patient was monitored for 1 day and then discharged. Patient stated that when he got home he was unable to move his right arm and leg. Patient stated that he had no strength and this concerned his wife and she decided to bring him to his PCP. Patient stated that the weakness improved throughout the day and after seeing his PCP he was instructed to see the neurologist. When the neurologist examined him, he was told to come to the ER for r/o ICH.    As per as wife he also had 4-5 episodes of shaking right upper and lower extremity lasting for few seconds with no loss of consciousness. he did not have any of those episodes again in last 12 hours.   Patient endorsed of occipital headache, but denied any slurred speech, facial droop, changes in vision, contralateral numbness or weakness. Patient denied any CP, SOB, fevers, chills, N/V/D/C, abdominal pain, dysuria, melena, hematochezia, recent travel, sick contact, pleuritic or positional chest pain.     On ED admission EKG revealed Atrial fibrillation with RVR with PVC, LAD, RBBB at a rate of 102 with QTc of 539, H&H: 11.1/37.5, Plt: 140, BUN/Cr: 43/3.41, Gluc: 168. CT head: There is a 1.5 x 2.0 cm hematoma in the left frontal lobe, with surrounding vasogenic edema.  There is extension of the hemorrhage into the subarachnoid space. Additional 1.9 cm x 1.3 cm hematoma in the right temporal occipital region (2, 12) with vasogenic edema.  While findings may be post traumatic in nature, the possibility of hemorrhage into underlying lesions cannot be noted. Repeat CT head: No interval change. No new hemorrhage or midline shift. Left frontal and right temporal occipital hemorrhages as above. In the ED patient was seen by House neurosurgery and MICU and their recommendations noted. (21 Mar 2019 05:24)    REVIEW OF SYSTEMS:  Constitutional: No fever, chills, fatigue, weakness.  Eyes: No eye pain, visual disturbances, or discharge.  ENT:  No difficulty hearing, tinnitus, vertigo. No sinus or throat pain.  Neck: No pain or stiffness.  Respiratory: No cough, dyspnea, wheezing.  Cardiovascular: No chest pain, palpitations.  Gastrointestinal: No abdominal pain. No nausea, vomiting, diarrhea, or constipation.   Genitourinary: No dysuria, frequency, hematuria or incontinence.  Neurological: No headaches, lightheadedness, vertigo, numbness or tremors. + weakness all over (R>L)   Psychiatric: No depression, anxiety, mood swings, or difficulty sleeping.  Musculoskeletal: No joint pain or swelling. No muscle, back, or extremity pain.  Skin: No itching, burning, rashes or lesions.   Lymph Nodes: No enlarged glands  Endocrine: No heat or cold intolerance, no hair loss.  Allergy and Immunologic: No hives or eczema.    MEDICATIONS  atorvastatin 20 milliGRAM(s) Oral at bedtime  calcitriol   Capsule 0.25 MICROGram(s) Oral daily  carvedilol 12.5 milliGRAM(s) Oral every 12 hours  dextrose 40% Gel 15 Gram(s) Oral once PRN  dextrose 5%. 1000 milliLiter(s) IV Continuous <Continuous>  dextrose 50% Injectable 12.5 Gram(s) IV Push once  dextrose 50% Injectable 25 Gram(s) IV Push once  dextrose 50% Injectable 25 Gram(s) IV Push once  doxazosin 2 milliGRAM(s) Oral at bedtime  glucagon  Injectable 1 milliGRAM(s) IntraMuscular once PRN  insulin glargine Injectable (LANTUS) 12 Unit(s) SubCutaneous at bedtime  insulin lispro (HumaLOG) corrective regimen sliding scale   SubCutaneous three times a day before meals  insulin lispro (HumaLOG) corrective regimen sliding scale   SubCutaneous at bedtime  levothyroxine 112 MICROGram(s) Oral daily      PMH: HLD (hyperlipidemia)  Essential hypertension  Hypothyroidism  Atrial fibrillation  History of BPH  Diabetes Mellitus Type II  DM (Dermatomyositis)  Hypothyroid  CHF (Congestive Heart Failure)  CAD (Coronary Artery Disease)       PSH: History of Appendectomy  History of Rotator Cuff Surgery  Stented Coronary Artery      FAMILY HISTORY:  No pertinent family history in first degree relatives    No history of dementia, strokes, or seizures     SOCIAL HISTORY:  No history of tobacco or alcohol use     Allergies    No Known Allergies    Intolerances    Vital Signs Last 24 Hrs  T(C): 36.8 (21 Mar 2019 05:09), Max: 36.9 (20 Mar 2019 14:41)  T(F): 98.3 (21 Mar 2019 05:09), Max: 98.5 (20 Mar 2019 14:41)  HR: 88 (21 Mar 2019 05:09) (75 - 98)  BP: 140/92 (21 Mar 2019 05:09) (118/65 - 141/98)  BP(mean): --  RR: 19 (21 Mar 2019 05:09) (14 - 19)  SpO2: 100% (21 Mar 2019 05:09) (100% - 100%)    Neurological Examination:    Mental Status: Patient is alert, awake, oriented X3. Patient is fluent, no dysarthria, no aphasia. Follows commands well and able to answer questions appropriately. Mood and affect normal.    Cranial Nerves: PERRL, EOMI, visual field intact, V1-V3 intact, no gross facial asymmetry, tongue/uvula midline    Motor Exam: Right upper and lower extremity drift   Right upper extremity: 4/5  Left upper extremity: 5/5  Right lower extremity: 4/5  Left lower extremity: 5/5    Normal bulk/tone    Sensory: Intact to light touch and pinprick bilaterally. No extinction    Coordination: Finger to nose intact bilaterally     Gait: Normal      Reflexes: Bilateral 2+ Biceps, Brachial, Patellar, Ankle  Plantar: Down bilateral    GENERAL: No acute distress  HEENT:  Normocephalic, atraumatic  EXTREMITIES: No edema, clubbing, cyanosis  MUSCULOSKELETAL: Normal range of motion  SKIN: No rashes    LABS:  CBC Full  -  ( 21 Mar 2019 05:50 )  WBC Count : 6.32 K/uL  Hemoglobin : 10.3 g/dL  Hematocrit : 34.6 %  Platelet Count - Automated : 151 K/uL  Mean Cell Volume : 70.9 fL  Mean Cell Hemoglobin : 21.1 pg  Mean Cell Hemoglobin Concentration : 29.8 %  Auto Neutrophil # : x  Auto Lymphocyte # : x  Auto Monocyte # : x  Auto Eosinophil # : x  Auto Basophil # : x  Auto Neutrophil % : x  Auto Lymphocyte % : x  Auto Monocyte % : x  Auto Eosinophil % : x  Auto Basophil % : x      03-21    143  |  109<H>  |  40<H>  ----------------------------<  107<H>  4.1   |  19<L>  |  3.11<H>    Ca    9.7      21 Mar 2019 05:50  Phos  2.5     03-21  Mg     1.9     03-21    TPro  6.8  /  Alb  3.6  /  TBili  0.4  /  DBili  x   /  AST  20  /  ALT  20  /  AlkPhos  72  03-20    LIVER FUNCTIONS - ( 20 Mar 2019 16:00 )  Alb: 3.6 g/dL / Pro: 6.8 g/dL / ALK PHOS: 72 u/L / ALT: 20 u/L / AST: 20 u/L / GGT: x           Hemoglobin A1C: Hemoglobin A1C, Whole Blood: 7.7 % (03-21 @ 05:50)    Lipid Panel 03-21 @ 05:50  Total Cholesterol, Serum 119  LDL 80  Triglycerides 71      PT/INR - ( 21 Mar 2019 05:50 )   PT: 13.2 SEC;   INR: 1.15          PTT - ( 21 Mar 2019 05:50 )  PTT:21.5 SEC    RADIOLOGY:  CT head:  < from: CT Head No Cont (03.20.19 @ 16:58) >  EXAM:  CT BRAIN        PROCEDURE DATE:  Mar 20 2019         INTERPRETATION:  CLINICAL INFORMATION: Dizziness, Status post fall 3 days   ago, mild constant occipital headache. Acute onset right upper extremity   right lower extremity weakness with inability to ambulate.    TECHNIQUE: Sequential axial images were obtained from the vertex to the   skull base without intravenous contrast. Coronal and sagittal   reformations were obtained.     COMPARISON: CT head 11/9/2013    FINDINGS:  There is a 1.5 x 2.0 cm hematoma in the left frontal lobe, with   surrounding vasogenic edema. There is extension of the hemorrhage into   the subarachnoid space.    Additional 1.9 cm x 1.3 cm hematoma in the right temporal occipital   region (2, 12). There is surrounding hypodensity and mild dilatation of   the right temporal horn. This may represent traumatic hemorrhage within a   chronic infarct, but may represent hemorrhage into an underlying lesion.    Mild dilatation of the right temporal horn, which may represent changes   from chronic infarct.     There are chronic white matter microvascular ischemic changes. The   ventricles and sulci are prominent likely related to age-appropriate   involutional changes    The calvarium is intact.     The visualized portions of the paranasal sinuses and mastoid air cells   are clear.    Status post bilateral lens replacement.    Soft tissue swelling overlying the right parietal scalp.    IMPRESSION:   There is a 1.5 x 2.0 cm hematoma in the left frontal lobe, with   surrounding vasogenic edema.  There is extension of the hemorrhage into   the subarachnoid space.  Additional 1.9 cm x 1.3 cm hematoma in the right temporal occipital   region (2, 12) with vasogenic edema.    While findings may be post traumatic in nature, the possibility of   hemorrhage into underlying lesions cannot be noted.     These findings were discussed with Dr. Somers at 3/20/2019 5:03 PM by Dr. Pozo with read back confirmation.    Brain MRI may be done for further evaluation, ifno contraindications   exist.     < end of copied text >    < from: CT Head No Cont (03.20.19 @ 23:12) >  FINDINGS:   There is the redemonstration of a 2.0 x 1.5 cm left frontal hematoma with   surrounding vasogenic edema. The hemorrhage again extends into the   subarachnoid space, not significantly changed from prior examination.      There is the redemonstration of a right temporal occipital hematoma now   measuring 1.9 x 1.3 cm, unchanged from prior examination with surrounding   vasogenic edema, consistent with a traumatic hemorrhage inside of a   superimposed chronic area of infarction or intralesional hemorrhage.      There is extensive bilateral white matter change consistent with chronic   ischemic disease.    Patient is status post bilateral lens replacement surgery.    There is exuberant soft tissue swelling overlying the right parietal   bone. No fracture is visualized.    IMPRESSION:   No interval change. No new hemorrhage or midline shift. Left frontal and   right temporal occipital hemorrhages as above.    < end of copied text >

## 2019-03-21 NOTE — H&P ADULT - HISTORY OF PRESENT ILLNESS
80 y/o M with PMH of CKD stage IV, CAD(s/p 16 stents), Afib(on Coumadin), TIA, BPH, Hypothyroidism was sent in by neurologist for RUE and RLE weakness 2/2 to recent fall. 80 y/o M who walks with a cane with PMH of CKD stage IV, CAD(s/p 16 stents), Afib(on Coumadin), TIA, BPH, Hypothyroidism was sent in by neurologist for RUE and RLE weakness 2/2 to recent fall. As per the patient on Sunday his wife was pushing up on the rollator when she went over a bump and patient fell backwards on to the street. Patient had LOC of about few minutes and then ambulance took him to [facility] where CT Head and C-spine was performed and patient was monitored for 1 day and then discharged. Patient stated that when he got home he was unable to move his right arm and leg. Patient stated that he had no strength and this concerned his wife and she decided to bring him to his PCP. Patient stated that the weakness improved throughout the day and after seeing his PCP he was instructed to see the neurologist. When the neurologist examined him, he was told to come to the ER for r/o ICH. Patient endorsed of occipital headache, but denied any slurred speech, facial droop, changes in vision, contralateral numbness or weakness. Patient denied any CP, SOB, fevers, chills, N/V/D/C, abdominal pain, dysuria, melena, hematochezia, recent travel, sick contact, pleuritic or positional chest pain.     On ED admission EKG revealed Atrial fibrillation with RVR with PVC, LAD, RBBB at a rate of 102 with QTc of 539, H&H: 11.1/37.5, Plt: 140, BUN/Cr: 43/3.41, Gluc: 168. CT head: There is a 1.5 x 2.0 cm hematoma in the left frontal lobe, with surrounding vasogenic edema.  There is extension of the hemorrhage into the subarachnoid space. Additional 1.9 cm x 1.3 cm hematoma in the right temporal occipital region (2, 12) with vasogenic edema.  While findings may be post traumatic in nature, the possibility of hemorrhage into underlying lesions cannot be noted. Repeat CT head: No interval change. No new hemorrhage or midline shift. Left frontal and right temporal occipital hemorrhages as above. In the ED patient was seen by House neurosurgery and MICU and their recommendations noted.

## 2019-03-21 NOTE — SWALLOW BEDSIDE ASSESSMENT ADULT - SWALLOW EVAL: RECOMMENDED FEEDING/EATING TECHNIQUES
small sips/bites/alternate food with liquid/check mouth frequently for oral residue/pocketing/crush medication (when feasible)/hard swallow w/ each bite or sip/no straws/position upright (90 degrees)/maintain upright posture during/after eating for 30 mins/oral hygiene/allow for swallow between intakes

## 2019-03-22 LAB
ANION GAP SERPL CALC-SCNC: 12 MMO/L — SIGNIFICANT CHANGE UP (ref 7–14)
APTT BLD: 26.8 SEC — LOW (ref 27.5–36.3)
BUN SERPL-MCNC: 43 MG/DL — HIGH (ref 7–23)
CALCIUM SERPL-MCNC: 9.6 MG/DL — SIGNIFICANT CHANGE UP (ref 8.4–10.5)
CHLORIDE SERPL-SCNC: 105 MMOL/L — SIGNIFICANT CHANGE UP (ref 98–107)
CO2 SERPL-SCNC: 23 MMOL/L — SIGNIFICANT CHANGE UP (ref 22–31)
CREAT SERPL-MCNC: 2.98 MG/DL — HIGH (ref 0.5–1.3)
GLUCOSE BLDC GLUCOMTR-MCNC: 230 MG/DL — HIGH (ref 70–99)
GLUCOSE BLDC GLUCOMTR-MCNC: 253 MG/DL — HIGH (ref 70–99)
GLUCOSE BLDC GLUCOMTR-MCNC: 275 MG/DL — HIGH (ref 70–99)
GLUCOSE BLDC GLUCOMTR-MCNC: 296 MG/DL — HIGH (ref 70–99)
GLUCOSE SERPL-MCNC: 206 MG/DL — HIGH (ref 70–99)
HCT VFR BLD CALC: 35 % — LOW (ref 39–50)
HGB BLD-MCNC: 10.3 G/DL — LOW (ref 13–17)
INR BLD: 1.04 — SIGNIFICANT CHANGE UP (ref 0.88–1.17)
MCHC RBC-ENTMCNC: 20.9 PG — LOW (ref 27–34)
MCHC RBC-ENTMCNC: 29.4 % — LOW (ref 32–36)
MCV RBC AUTO: 71 FL — LOW (ref 80–100)
NRBC # FLD: 0 K/UL — LOW (ref 25–125)
PLATELET # BLD AUTO: 142 K/UL — LOW (ref 150–400)
PMV BLD: 11.6 FL — SIGNIFICANT CHANGE UP (ref 7–13)
POTASSIUM SERPL-MCNC: 3.9 MMOL/L — SIGNIFICANT CHANGE UP (ref 3.5–5.3)
POTASSIUM SERPL-SCNC: 3.9 MMOL/L — SIGNIFICANT CHANGE UP (ref 3.5–5.3)
PROTHROM AB SERPL-ACNC: 11.9 SEC — SIGNIFICANT CHANGE UP (ref 9.8–13.1)
RBC # BLD: 4.93 M/UL — SIGNIFICANT CHANGE UP (ref 4.2–5.8)
RBC # FLD: 16.7 % — HIGH (ref 10.3–14.5)
SODIUM SERPL-SCNC: 140 MMOL/L — SIGNIFICANT CHANGE UP (ref 135–145)
WBC # BLD: 6.01 K/UL — SIGNIFICANT CHANGE UP (ref 3.8–10.5)
WBC # FLD AUTO: 6.01 K/UL — SIGNIFICANT CHANGE UP (ref 3.8–10.5)

## 2019-03-22 PROCEDURE — 99222 1ST HOSP IP/OBS MODERATE 55: CPT

## 2019-03-22 PROCEDURE — 99223 1ST HOSP IP/OBS HIGH 75: CPT

## 2019-03-22 PROCEDURE — 70450 CT HEAD/BRAIN W/O DYE: CPT | Mod: 26

## 2019-03-22 RX ORDER — CALCITRIOL 0.5 UG/1
0.25 CAPSULE ORAL
Qty: 0 | Refills: 0 | Status: DISCONTINUED | OUTPATIENT
Start: 2019-03-22 | End: 2019-03-25

## 2019-03-22 RX ORDER — LOSARTAN POTASSIUM 100 MG/1
25 TABLET, FILM COATED ORAL DAILY
Qty: 0 | Refills: 0 | Status: DISCONTINUED | OUTPATIENT
Start: 2019-03-22 | End: 2019-03-25

## 2019-03-22 RX ORDER — ACETAMINOPHEN 500 MG
650 TABLET ORAL EVERY 6 HOURS
Qty: 0 | Refills: 0 | Status: DISCONTINUED | OUTPATIENT
Start: 2019-03-22 | End: 2019-03-25

## 2019-03-22 RX ORDER — CHOLECALCIFEROL (VITAMIN D3) 125 MCG
2000 CAPSULE ORAL DAILY
Qty: 0 | Refills: 0 | Status: DISCONTINUED | OUTPATIENT
Start: 2019-03-22 | End: 2019-03-25

## 2019-03-22 RX ADMIN — Medication 6 UNIT(S): at 18:14

## 2019-03-22 RX ADMIN — Medication 2: at 09:35

## 2019-03-22 RX ADMIN — CARVEDILOL PHOSPHATE 12.5 MILLIGRAM(S): 80 CAPSULE, EXTENDED RELEASE ORAL at 05:34

## 2019-03-22 RX ADMIN — Medication 2000 UNIT(S): at 15:00

## 2019-03-22 RX ADMIN — CARVEDILOL PHOSPHATE 12.5 MILLIGRAM(S): 80 CAPSULE, EXTENDED RELEASE ORAL at 17:31

## 2019-03-22 RX ADMIN — Medication 650 MILLIGRAM(S): at 12:00

## 2019-03-22 RX ADMIN — CALCITRIOL 0.25 MICROGRAM(S): 0.5 CAPSULE ORAL at 15:00

## 2019-03-22 RX ADMIN — LOSARTAN POTASSIUM 25 MILLIGRAM(S): 100 TABLET, FILM COATED ORAL at 14:59

## 2019-03-22 RX ADMIN — Medication 112 MICROGRAM(S): at 05:34

## 2019-03-22 RX ADMIN — Medication 2 MILLIGRAM(S): at 21:45

## 2019-03-22 RX ADMIN — Medication 3: at 18:14

## 2019-03-22 RX ADMIN — Medication 1: at 21:46

## 2019-03-22 RX ADMIN — INSULIN GLARGINE 12 UNIT(S): 100 INJECTION, SOLUTION SUBCUTANEOUS at 21:46

## 2019-03-22 RX ADMIN — Medication 3: at 13:26

## 2019-03-22 RX ADMIN — Medication 6 UNIT(S): at 13:26

## 2019-03-22 RX ADMIN — Medication 650 MILLIGRAM(S): at 11:06

## 2019-03-22 RX ADMIN — Medication 6 UNIT(S): at 09:35

## 2019-03-22 RX ADMIN — ATORVASTATIN CALCIUM 20 MILLIGRAM(S): 80 TABLET, FILM COATED ORAL at 21:45

## 2019-03-22 NOTE — CONSULT NOTE ADULT - CONSULT REASON
traumatic head bleed
Elevated Scr
Evaluate Rehabilitation Needs
Weakness on right
fall with head bleed

## 2019-03-22 NOTE — CONSULT NOTE ADULT - ASSESSMENT
79y Male with history of HTN, DM, ICM presents with RUE weakness found to have R temporal and L frontal hematomas. Nephrology consulted for elevated Scr.    1) CKD-4: Patient with CKD-4 with proteinuria with baseline Scr 3-3.2 for which patient follows with me as an outpatient. Renal function at baseline. Avoid nephrotoxins. Monitor electrolytes.  2) HTN with CKD: BP uncontrolled (goal BP < 140/90 as per neuro). Please restart losartan 25 mg daily. Monitor BP.  3) LE edema: Mild. Plan to resume home lasix 20 mg daily prior to discharge. Monitor UO.  4) Secondary HPT of renal origin: Please change calcitriol to MWF (home schedule) and start cholecalciferol 2000 IU daily. Monitor serum calcium and phosphorus.

## 2019-03-22 NOTE — CONSULT NOTE ADULT - SUBJECTIVE AND OBJECTIVE BOX
[facility] NEPHROLOGY- CONSULTATION NOTE    79y Male with history of below presents with RUE weakness. Nephrology consulted for elevated Scr.    Patient well known to nephrology as follows with me as an outpatient for h/o CKD-4 with baseline Scr 3-3.2. Patient on losartan 25 mg daily and lasix 20 mg daily as an outpatient. Both medications held on current admission.    REVIEW OF SYSTEMS:  Gen: no changes in weight  HEENT: no rhinorrhea  Neck: no sore throat  Cards: no chest pain  Resp: no dyspnea  GI: no nausea or vomiting or diarrhea  : no dysuria or hematuria  Vascular: + LE edema  Derm: no rashes  Neuro: + RUE weaknes    No Known Allergies      Home Medications Reviewed  Hospital Medications:   MEDICATIONS  (STANDING):  atorvastatin 20 milliGRAM(s) Oral at bedtime  calcitriol   Capsule 0.25 MICROGram(s) Oral daily  carvedilol 12.5 milliGRAM(s) Oral every 12 hours  dextrose 5%. 1000 milliLiter(s) (50 mL/Hr) IV Continuous <Continuous>  dextrose 50% Injectable 12.5 Gram(s) IV Push once  dextrose 50% Injectable 25 Gram(s) IV Push once  dextrose 50% Injectable 25 Gram(s) IV Push once  doxazosin 2 milliGRAM(s) Oral at bedtime  insulin glargine Injectable (LANTUS) 12 Unit(s) SubCutaneous at bedtime  insulin lispro (HumaLOG) corrective regimen sliding scale   SubCutaneous three times a day before meals  insulin lispro (HumaLOG) corrective regimen sliding scale   SubCutaneous at bedtime  insulin lispro Injectable (HumaLOG) 6 Unit(s) SubCutaneous three times a day before meals  levothyroxine 112 MICROGram(s) Oral daily      PAST MEDICAL & SURGICAL HISTORY:  HLD (hyperlipidemia)  Essential hypertension  Hypothyroidism  Atrial fibrillation  History of BPH  Diabetes Mellitus Type II  CAD (Coronary Artery Disease): s/p 16 stents  History of Appendectomy  History of Rotator Cuff Surgery: right  Stented Coronary Artery: 16 stents in place      FAMILY HISTORY:  No pertinent family history in first degree relatives      SOCIAL HISTORY:  Denies toxic substance use     VITALS:  T(F): 98 (03-22-19 @ 10:35), Max: 98.8 (03-21-19 @ 21:49)  HR: 82 (03-22-19 @ 10:35)  BP: 157/67 (03-22-19 @ 10:35)  RR: 18 (03-22-19 @ 10:35)  SpO2: 100% (03-22-19 @ 10:35)  Wt(kg): --        PHYSICAL EXAM:  Gen: NAD, calm  HEENT: MMM  Neck: no JVD  Cards: Irregularly irregular, +S1/S2, no M/G/R  Resp: CTA B/L  GI: soft, NT/ND, NABS  : no CVA tenderness  Vascular: trace LE edema B/L  Derm: no rashes  Neuro: + RUE weakness    LABS:  03-22    140  |  105  |  43<H>  ----------------------------<  206<H>  3.9   |  23  |  2.98<H>    Ca    9.6      22 Mar 2019 05:41  Phos  2.5     03-21  Mg     1.9     03-21    TPro  6.8  /  Alb  3.6  /  TBili  0.4  /  DBili      /  AST  20  /  ALT  20  /  AlkPhos  72  03-20    Creatinine Trend: 2.98 <--, 3.11 <--, 3.41 <--                        10.3   6.01  )-----------( 142      ( 22 Mar 2019 05:35 )             35.0     Urine Studies:        RADIOLOGY & ADDITIONAL STUDIES:  < from: CT Head No Cont (03.22.19 @ 09:09) >  IMPRESSION:    The right temporal and left frontal hematomas are stable in size.    < end of copied text >

## 2019-03-22 NOTE — CONSULT NOTE ADULT - SUBJECTIVE AND OBJECTIVE BOX
cc: Gait dysfunction    Admission HPI:  78 y/o M who walks with a cane with PMH of CKD stage IV, CAD(s/p 16 stents), Afib(on Coumadin), TIA, BPH, Hypothyroidism was sent in by neurologist for RUE and RLE weakness 2/2 to recent fall. As per the patient on Sunday his wife was pushing up on the rollator when she went over a bump and patient fell backwards on to the street. Patient had LOC of about few minutes and then ambulance took him to [facility] where CT Head and C-spine was performed and patient was monitored for 1 day and then discharged. Patient stated that when he got home he was unable to move his right arm and leg. Patient stated that he had no strength and this concerned his wife and she decided to bring him to his PCP. Patient stated that the weakness improved throughout the day and after seeing his PCP he was instructed to see the neurologist. When the neurologist examined him, he was told to come to the ER for r/o ICH. Patient endorsed of occipital headache, but denied any slurred speech, facial droop, changes in vision, contralateral numbness or weakness. Patient denied any CP, SOB, fevers, chills, N/V/D/C, abdominal pain, dysuria, melena, hematochezia, recent travel, sick contact, pleuritic or positional chest pain.     On ED admission EKG revealed Atrial fibrillation with RVR with PVC, LAD, RBBB at a rate of 102 with QTc of 539, H&H: 11.1/37.5, Plt: 140, BUN/Cr: 43/3.41, Gluc: 168. CT head: There is a 1.5 x 2.0 cm hematoma in the left frontal lobe, with surrounding vasogenic edema.  There is extension of the hemorrhage into the subarachnoid space. Additional 1.9 cm x 1.3 cm hematoma in the right temporal occipital region (2, 12) with vasogenic edema.  While findings may be post traumatic in nature, the possibility of hemorrhage into underlying lesions cannot be noted. Repeat CT head: No interval change. No new hemorrhage or midline shift. Left frontal and right temporal occipital hemorrhages as above. In the ED patient was seen by House neurosurgery and MICU and their recommendations noted. (21 Mar 2019 05:24)    Interval history:  Patient had MRI which notes hemorrhagic masses of the left frontal and right temporal lobes.   Patient with persistent functional deficits.     REVIEW OF SYSTEMS: No chest pain, shortness of breath, nausea, vomiting or diarhea; other ROS neg     PAST MEDICAL & SURGICAL HISTORY  HLD (hyperlipidemia)  Essential hypertension  Hypothyroidism  Atrial fibrillation  History of BPH  Diabetes Mellitus Type II  DM (Dermatomyositis)  Hypothyroid  CHF (Congestive Heart Failure)  CAD (Coronary Artery Disease)  History of Appendectomy  History of Rotator Cuff Surgery  Stented Coronary Artery    FUNCTIONAL HISTORY:   Lives with spouse in home with stairs and stair lift  PTA Independent- uses cane to ambulate    CURRENT FUNCTIONAL STATUS:  Min A transfers and gait.     FAMILY HISTORY   No pertinent family history in first degree relatives      RECENT LABS/IMAGING  CBC Full  -  ( 22 Mar 2019 05:35 )  WBC Count : 6.01 K/uL  Hemoglobin : 10.3 g/dL  Hematocrit : 35.0 %  Platelet Count - Automated : 142 K/uL  Mean Cell Volume : 71.0 fL  Mean Cell Hemoglobin : 20.9 pg  Mean Cell Hemoglobin Concentration : 29.4 %  Auto Neutrophil # : x  Auto Lymphocyte # : x  Auto Monocyte # : x  Auto Eosinophil # : x  Auto Basophil # : x  Auto Neutrophil % : x  Auto Lymphocyte % : x  Auto Monocyte % : x  Auto Eosinophil % : x  Auto Basophil % : x    03-22    140  |  105  |  43<H>  ----------------------------<  206<H>  3.9   |  23  |  2.98<H>    Ca    9.6      22 Mar 2019 05:41  Phos  2.5     03-21  Mg     1.9     03-21    TPro  6.8  /  Alb  3.6  /  TBili  0.4  /  DBili  x   /  AST  20  /  ALT  20  /  AlkPhos  72  03-20        VITALS  T(C): 36.9 (03-22-19 @ 05:30), Max: 37.1 (03-21-19 @ 21:49)  HR: 92 (03-22-19 @ 05:30) (61 - 94)  BP: 149/73 (03-22-19 @ 05:30) (128/63 - 152/64)  RR: 18 (03-22-19 @ 05:30) (16 - 18)  SpO2: 98% (03-22-19 @ 05:30) (95% - 98%)  Wt(kg): --    ALLERGIES  No Known Allergies      MEDICATIONS   atorvastatin 20 milliGRAM(s) Oral at bedtime  calcitriol   Capsule 0.25 MICROGram(s) Oral daily  carvedilol 12.5 milliGRAM(s) Oral every 12 hours  dextrose 40% Gel 15 Gram(s) Oral once PRN  dextrose 5%. 1000 milliLiter(s) IV Continuous <Continuous>  dextrose 50% Injectable 12.5 Gram(s) IV Push once  dextrose 50% Injectable 25 Gram(s) IV Push once  dextrose 50% Injectable 25 Gram(s) IV Push once  doxazosin 2 milliGRAM(s) Oral at bedtime  glucagon  Injectable 1 milliGRAM(s) IntraMuscular once PRN  insulin glargine Injectable (LANTUS) 12 Unit(s) SubCutaneous at bedtime  insulin lispro (HumaLOG) corrective regimen sliding scale   SubCutaneous three times a day before meals  insulin lispro (HumaLOG) corrective regimen sliding scale   SubCutaneous at bedtime  insulin lispro Injectable (HumaLOG) 6 Unit(s) SubCutaneous three times a day before meals  levothyroxine 112 MICROGram(s) Oral daily      ----------------------------------------------------------------------------------------  PHYSICAL EXAM  Constitutional - NAD, Comfortable  HEENT - NCAT, EOMI  Neck - Supple, No limited ROM  Chest - CTA bilaterally, No wheeze, No rhonchi, No crackles  Cardiovascular - RRR, S1S2, No murmurs  Abdomen - BS+, Soft, NTND  Extremities - No C/C/E, No calf tenderness   Neurologic Exam -                    Cognitive - Awake, Alert, AAO to self, place, date, year, situation     Communication - Fluent, No dysarthria, no aphasia     Cranial Nerves - CN 2-12 intact     Motor - No focal deficits      Sensory - Intact to LT     Reflexes - DTR Intact, No primitive reflexive       Psychiatric - Mood stable, Affect WNL      Impression:   78 yo with functional deficits secondary to diagnosis of hemorrhagic masses of bilateral brain    Plan:  PT- ROM, Bed Mob, Transfers, Amb w AD and bracing as needed  OT- ADLs, bracing  SLP- Dysphagia eval and treat  Prec- Falls, Cardiac  DVT Prophylaxis  Skin- Turn q2 h  Dispo- cc: Gait dysfunction    Admission HPI:  78 y/o M who walks with a cane with PMH of CKD stage IV, CAD(s/p 16 stents), Afib(on Coumadin), TIA, BPH, Hypothyroidism was sent in by neurologist for RUE and RLE weakness 2/2 to recent fall. As per the patient on Sunday his wife was pushing up on the rollator when she went over a bump and patient fell backwards on to the street. Patient had LOC of about few minutes and then ambulance took him to [facility] where CT Head and C-spine was performed and patient was monitored for 1 day and then discharged. Patient stated that when he got home he was unable to move his right arm and leg. Patient stated that he had no strength and this concerned his wife and she decided to bring him to his PCP. Patient stated that the weakness improved throughout the day and after seeing his PCP he was instructed to see the neurologist. When the neurologist examined him, he was told to come to the ER for r/o ICH. Patient endorsed of occipital headache, but denied any slurred speech, facial droop, changes in vision, contralateral numbness or weakness. Patient denied any CP, SOB, fevers, chills, N/V/D/C, abdominal pain, dysuria, melena, hematochezia, recent travel, sick contact, pleuritic or positional chest pain.     On ED admission EKG revealed Atrial fibrillation with RVR with PVC, LAD, RBBB at a rate of 102 with QTc of 539, H&H: 11.1/37.5, Plt: 140, BUN/Cr: 43/3.41, Gluc: 168. CT head: There is a 1.5 x 2.0 cm hematoma in the left frontal lobe, with surrounding vasogenic edema.  There is extension of the hemorrhage into the subarachnoid space. Additional 1.9 cm x 1.3 cm hematoma in the right temporal occipital region (2, 12) with vasogenic edema.  While findings may be post traumatic in nature, the possibility of hemorrhage into underlying lesions cannot be noted. Repeat CT head: No interval change. No new hemorrhage or midline shift. Left frontal and right temporal occipital hemorrhages as above. In the ED patient was seen by House neurosurgery and MICU and their recommendations noted. (21 Mar 2019 05:24)    Interval history:  Patient had MRI which notes hemorrhagic masses of the left frontal and right temporal lobes.   Patient with persistent functional deficits.     REVIEW OF SYSTEMS: +HA, + poor balance, No chest pain, shortness of breath, nausea, vomiting or diarhea; other ROS neg     PAST MEDICAL & SURGICAL HISTORY  HLD (hyperlipidemia)  Essential hypertension  Hypothyroidism  Atrial fibrillation  History of BPH  Diabetes Mellitus Type II  DM (Dermatomyositis)  Hypothyroid  CHF (Congestive Heart Failure)  CAD (Coronary Artery Disease)  History of Appendectomy  History of Rotator Cuff Surgery  Stented Coronary Artery    FUNCTIONAL HISTORY:   Lives with spouse in home with stairs and stair lift  PTA Independent- uses cane to ambulate    CURRENT FUNCTIONAL STATUS:  Min A transfers and gait.     FAMILY HISTORY   No pertinent family history in first degree relatives      RECENT LABS/IMAGING  CBC Full  -  ( 22 Mar 2019 05:35 )  WBC Count : 6.01 K/uL  Hemoglobin : 10.3 g/dL  Hematocrit : 35.0 %  Platelet Count - Automated : 142 K/uL  Mean Cell Volume : 71.0 fL  Mean Cell Hemoglobin : 20.9 pg  Mean Cell Hemoglobin Concentration : 29.4 %  Auto Neutrophil # : x  Auto Lymphocyte # : x  Auto Monocyte # : x  Auto Eosinophil # : x  Auto Basophil # : x  Auto Neutrophil % : x  Auto Lymphocyte % : x  Auto Monocyte % : x  Auto Eosinophil % : x  Auto Basophil % : x    03-22    140  |  105  |  43<H>  ----------------------------<  206<H>  3.9   |  23  |  2.98<H>    Ca    9.6      22 Mar 2019 05:41  Phos  2.5     03-21  Mg     1.9     03-21    TPro  6.8  /  Alb  3.6  /  TBili  0.4  /  DBili  x   /  AST  20  /  ALT  20  /  AlkPhos  72  03-20        VITALS  T(C): 36.9 (03-22-19 @ 05:30), Max: 37.1 (03-21-19 @ 21:49)  HR: 92 (03-22-19 @ 05:30) (61 - 94)  BP: 149/73 (03-22-19 @ 05:30) (128/63 - 152/64)  RR: 18 (03-22-19 @ 05:30) (16 - 18)  SpO2: 98% (03-22-19 @ 05:30) (95% - 98%)  Wt(kg): --    ALLERGIES  No Known Allergies      MEDICATIONS   atorvastatin 20 milliGRAM(s) Oral at bedtime  calcitriol   Capsule 0.25 MICROGram(s) Oral daily  carvedilol 12.5 milliGRAM(s) Oral every 12 hours  dextrose 40% Gel 15 Gram(s) Oral once PRN  dextrose 5%. 1000 milliLiter(s) IV Continuous <Continuous>  dextrose 50% Injectable 12.5 Gram(s) IV Push once  dextrose 50% Injectable 25 Gram(s) IV Push once  dextrose 50% Injectable 25 Gram(s) IV Push once  doxazosin 2 milliGRAM(s) Oral at bedtime  glucagon  Injectable 1 milliGRAM(s) IntraMuscular once PRN  insulin glargine Injectable (LANTUS) 12 Unit(s) SubCutaneous at bedtime  insulin lispro (HumaLOG) corrective regimen sliding scale   SubCutaneous three times a day before meals  insulin lispro (HumaLOG) corrective regimen sliding scale   SubCutaneous at bedtime  insulin lispro Injectable (HumaLOG) 6 Unit(s) SubCutaneous three times a day before meals  levothyroxine 112 MICROGram(s) Oral daily      ----------------------------------------------------------------------------------------  PHYSICAL EXAM  Constitutional - NAD, Comfortable  HEENT - NCAT, EOMI  Neck - Supple, No limited ROM  Chest - CTA bilaterally, No wheeze, No rhonchi, No crackles  Cardiovascular - RRR, S1S2, No murmurs  Abdomen - BS+, Soft, NTND  Extremities - No C/C/E, No calf tenderness   Neurologic Exam -                   AAO x 3   Speech clear/fluent/appropriate  Motor 4+/5 bl UE and LEs  Min A sit to stand     Psychiatric - Mood stable, Affect WNL      Impression:   80 yo with functional deficits secondary to diagnosis of hemorrhagic masses of bilateral brain    Plan:  Continue w/u per Primary Team  PT- ROM, Bed Mob, Transfers, Amb w AD and bracing as needed  OT- ADLs, bracing  SLP- Dysphagia eval and treat  Prec- Falls, Cardiac  DVT Prophylaxis- SCDs  Skin- Turn q2 h  Dispo- Acute Rehab- can tolerate 3h/d PT/OT/SLP and requires daily physician visits

## 2019-03-22 NOTE — CONSULT NOTE ADULT - ATTENDING COMMENTS
[facility] NEPHROLOGY  Kang Arceo M.D.  Ilia Melgar D.O.  Cata Velásquez M.D.  Heike Guaman, MSN, ANP-C    Telephone: [phone]  Facsimile: [phone]    [address] [place] [address]
79-year-old right-handed white gentleman first evaluated at [facility] on 03/22/19 with right hemiparesis. He has a history of atrial fibrillation and coronary stents, treated with Coumadin and baby aspirin. His most recent stents were in 2016. On 3/17/19, he fell with apparent loss of consciousness for several minutes. He was evaluated at [facility] and released. At some point over the subsequent 4 days prior to admission, he developed right hemiparesis, gait difficulty, and some difficulty with speech output. Medical history also includes a "TIA" in 2013, and CKD stage IV. ROS otherwise negative. Exam. Alert and attentive; speech subtly disfluent, with mild word finding pauses; possibly rare semantic paraphasias; could follow crossed commands; left homonymous hemianopia; right-sided drift of arm and leg; gait not tested; remainder of neurologic exam was nonfocal. CT head (3/21/19) to my eye showed 2 acute hemorrhages: Right temporo-occipital, 1.9 x 1.3 cm; left high frontal involving the centrum semiovale, 1.5 x 2 cm. 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 INR (3/21/19) = 3. Impression. On 3/17/19, he fell with possible loss of consciousness but without other focal deficits. Over 4 days prior to admission, he was noted to have right hemiparesis and mild motor aphasia, most likely due to a left high frontal lobar hemorrhage, and he also has a right temporo-occipital lobar hemorrhage. The etiology of the hemorrhages may be anticoagulation since he had been on both Coumadin and aspirin, possibly superimposed on amyloid angiopathy. I don't think that the hemorrhages represent dense hemorrhagic transformation of infarction. Suggest. TTE; Repeat CT head in 48 hours, and, if hemorrhages are stable, start chemical DVT prophylaxis; keep blood pressure less than 140/90; hold Coumadin and aspirin; if he requires aspirin for his coronary stents, most likely will be safe to restart aspirin in approximately one week, if possible after repeat CT head shows stability of the hemorrhages; in about 6 weeks from onset of hemorrhages, can restart anticoagulation, and consider using apixaban (if possible given renal dysfunction) instead of Coumadin if there is no significant mitral stenosis; may be a candidate eventually for a Watchman procedure; PT/OT/speech therapy.
traumatic head bleed on AC  s/p DDAVP and K centra with reversal of coagulapthy   repeat CT head in 6 hours  frequent neuro checks  neuro and neurosurg
Patient seen and examined  Agree with above

## 2019-03-23 LAB
ANION GAP SERPL CALC-SCNC: 10 MMO/L — SIGNIFICANT CHANGE UP (ref 7–14)
BASOPHILS # BLD AUTO: 0.03 K/UL — SIGNIFICANT CHANGE UP (ref 0–0.2)
BASOPHILS NFR BLD AUTO: 0.5 % — SIGNIFICANT CHANGE UP (ref 0–2)
BUN SERPL-MCNC: 47 MG/DL — HIGH (ref 7–23)
CALCIUM SERPL-MCNC: 9.4 MG/DL — SIGNIFICANT CHANGE UP (ref 8.4–10.5)
CHLORIDE SERPL-SCNC: 105 MMOL/L — SIGNIFICANT CHANGE UP (ref 98–107)
CO2 SERPL-SCNC: 23 MMOL/L — SIGNIFICANT CHANGE UP (ref 22–31)
CREAT SERPL-MCNC: 3.14 MG/DL — HIGH (ref 0.5–1.3)
EOSINOPHIL # BLD AUTO: 0.13 K/UL — SIGNIFICANT CHANGE UP (ref 0–0.5)
EOSINOPHIL NFR BLD AUTO: 2.3 % — SIGNIFICANT CHANGE UP (ref 0–6)
GLUCOSE BLDC GLUCOMTR-MCNC: 191 MG/DL — HIGH (ref 70–99)
GLUCOSE BLDC GLUCOMTR-MCNC: 246 MG/DL — HIGH (ref 70–99)
GLUCOSE BLDC GLUCOMTR-MCNC: 287 MG/DL — HIGH (ref 70–99)
GLUCOSE BLDC GLUCOMTR-MCNC: 365 MG/DL — HIGH (ref 70–99)
GLUCOSE SERPL-MCNC: 343 MG/DL — HIGH (ref 70–99)
HCT VFR BLD CALC: 33.9 % — LOW (ref 39–50)
HGB BLD-MCNC: 10 G/DL — LOW (ref 13–17)
IMM GRANULOCYTES NFR BLD AUTO: 1.1 % — SIGNIFICANT CHANGE UP (ref 0–1.5)
INR BLD: 1.03 — SIGNIFICANT CHANGE UP (ref 0.88–1.17)
LYMPHOCYTES # BLD AUTO: 0.71 K/UL — LOW (ref 1–3.3)
LYMPHOCYTES # BLD AUTO: 12.5 % — LOW (ref 13–44)
MAGNESIUM SERPL-MCNC: 2 MG/DL — SIGNIFICANT CHANGE UP (ref 1.6–2.6)
MCHC RBC-ENTMCNC: 21.2 PG — LOW (ref 27–34)
MCHC RBC-ENTMCNC: 29.5 % — LOW (ref 32–36)
MCV RBC AUTO: 71.8 FL — LOW (ref 80–100)
MONOCYTES # BLD AUTO: 0.61 K/UL — SIGNIFICANT CHANGE UP (ref 0–0.9)
MONOCYTES NFR BLD AUTO: 10.7 % — SIGNIFICANT CHANGE UP (ref 2–14)
NEUTROPHILS # BLD AUTO: 4.14 K/UL — SIGNIFICANT CHANGE UP (ref 1.8–7.4)
NEUTROPHILS NFR BLD AUTO: 72.9 % — SIGNIFICANT CHANGE UP (ref 43–77)
NRBC # FLD: 0 K/UL — LOW (ref 25–125)
PHOSPHATE SERPL-MCNC: 3 MG/DL — SIGNIFICANT CHANGE UP (ref 2.5–4.5)
PLATELET # BLD AUTO: 135 K/UL — LOW (ref 150–400)
PMV BLD: 11.8 FL — SIGNIFICANT CHANGE UP (ref 7–13)
POTASSIUM SERPL-MCNC: 4.2 MMOL/L — SIGNIFICANT CHANGE UP (ref 3.5–5.3)
POTASSIUM SERPL-SCNC: 4.2 MMOL/L — SIGNIFICANT CHANGE UP (ref 3.5–5.3)
PROTHROM AB SERPL-ACNC: 11.8 SEC — SIGNIFICANT CHANGE UP (ref 9.8–13.1)
RBC # BLD: 4.72 M/UL — SIGNIFICANT CHANGE UP (ref 4.2–5.8)
RBC # FLD: 16.1 % — HIGH (ref 10.3–14.5)
SODIUM SERPL-SCNC: 138 MMOL/L — SIGNIFICANT CHANGE UP (ref 135–145)
WBC # BLD: 5.68 K/UL — SIGNIFICANT CHANGE UP (ref 3.8–10.5)
WBC # FLD AUTO: 5.68 K/UL — SIGNIFICANT CHANGE UP (ref 3.8–10.5)

## 2019-03-23 RX ADMIN — Medication 1: at 17:54

## 2019-03-23 RX ADMIN — Medication 112 MICROGRAM(S): at 05:55

## 2019-03-23 RX ADMIN — Medication 6 UNIT(S): at 09:10

## 2019-03-23 RX ADMIN — CARVEDILOL PHOSPHATE 12.5 MILLIGRAM(S): 80 CAPSULE, EXTENDED RELEASE ORAL at 05:55

## 2019-03-23 RX ADMIN — Medication 6 UNIT(S): at 17:53

## 2019-03-23 RX ADMIN — ATORVASTATIN CALCIUM 20 MILLIGRAM(S): 80 TABLET, FILM COATED ORAL at 21:52

## 2019-03-23 RX ADMIN — Medication 3: at 13:02

## 2019-03-23 RX ADMIN — LOSARTAN POTASSIUM 25 MILLIGRAM(S): 100 TABLET, FILM COATED ORAL at 05:55

## 2019-03-23 RX ADMIN — Medication 6 UNIT(S): at 13:02

## 2019-03-23 RX ADMIN — Medication 5: at 09:11

## 2019-03-23 RX ADMIN — Medication 2 MILLIGRAM(S): at 21:52

## 2019-03-23 RX ADMIN — INSULIN GLARGINE 12 UNIT(S): 100 INJECTION, SOLUTION SUBCUTANEOUS at 21:52

## 2019-03-23 RX ADMIN — Medication 2000 UNIT(S): at 11:30

## 2019-03-23 NOTE — CHART NOTE - NSCHARTNOTEFT_GEN_A_CORE
Repeat CT head for IPH stable. Do not anticipate any surgical intervention at this time. D/w Dr. Ruggiero- would request Aspirin to be held for 1 week and Coumadin for 2 weeks. Patient to follow up with Dr. Fidencio Ruggiero within 1-2 weeks of discharge  Page PRN questions

## 2019-03-23 NOTE — PROGRESS NOTE ADULT - ATTENDING COMMENTS
[facility] NEPHROLOGY  Kang Arceo M.D.  Ilia Melgar D.O.  Cata Velásquez M.D.  Heike Guaman, MSN, ANP-C    Telephone: [phone]  Facsimile: [phone]    [address] [place] [address]

## 2019-03-24 LAB
ANION GAP SERPL CALC-SCNC: 11 MMO/L — SIGNIFICANT CHANGE UP (ref 7–14)
ANISOCYTOSIS BLD QL: SLIGHT — SIGNIFICANT CHANGE UP
BASOPHILS # BLD AUTO: 0.03 K/UL — SIGNIFICANT CHANGE UP (ref 0–0.2)
BASOPHILS NFR BLD AUTO: 0.5 % — SIGNIFICANT CHANGE UP (ref 0–2)
BUN SERPL-MCNC: 45 MG/DL — HIGH (ref 7–23)
CALCIUM SERPL-MCNC: 9.5 MG/DL — SIGNIFICANT CHANGE UP (ref 8.4–10.5)
CHLORIDE SERPL-SCNC: 104 MMOL/L — SIGNIFICANT CHANGE UP (ref 98–107)
CO2 SERPL-SCNC: 23 MMOL/L — SIGNIFICANT CHANGE UP (ref 22–31)
CREAT SERPL-MCNC: 2.97 MG/DL — HIGH (ref 0.5–1.3)
EOSINOPHIL # BLD AUTO: 0.15 K/UL — SIGNIFICANT CHANGE UP (ref 0–0.5)
EOSINOPHIL NFR BLD AUTO: 2.7 % — SIGNIFICANT CHANGE UP (ref 0–6)
GIANT PLATELETS BLD QL SMEAR: PRESENT — SIGNIFICANT CHANGE UP
GLUCOSE BLDC GLUCOMTR-MCNC: 147 MG/DL — HIGH (ref 70–99)
GLUCOSE BLDC GLUCOMTR-MCNC: 167 MG/DL — HIGH (ref 70–99)
GLUCOSE BLDC GLUCOMTR-MCNC: 228 MG/DL — HIGH (ref 70–99)
GLUCOSE BLDC GLUCOMTR-MCNC: 245 MG/DL — HIGH (ref 70–99)
GLUCOSE SERPL-MCNC: 228 MG/DL — HIGH (ref 70–99)
HCT VFR BLD CALC: 35.8 % — LOW (ref 39–50)
HGB BLD-MCNC: 10.4 G/DL — LOW (ref 13–17)
IMM GRANULOCYTES NFR BLD AUTO: 0.5 % — SIGNIFICANT CHANGE UP (ref 0–1.5)
INR BLD: 1.16 — SIGNIFICANT CHANGE UP (ref 0.88–1.17)
LYMPHOCYTES # BLD AUTO: 0.68 K/UL — LOW (ref 1–3.3)
LYMPHOCYTES # BLD AUTO: 12.1 % — LOW (ref 13–44)
MAGNESIUM SERPL-MCNC: 1.9 MG/DL — SIGNIFICANT CHANGE UP (ref 1.6–2.6)
MCHC RBC-ENTMCNC: 20.9 PG — LOW (ref 27–34)
MCHC RBC-ENTMCNC: 29.1 % — LOW (ref 32–36)
MCV RBC AUTO: 71.9 FL — LOW (ref 80–100)
MICROCYTES BLD QL: SLIGHT — SIGNIFICANT CHANGE UP
MONOCYTES # BLD AUTO: 0.6 K/UL — SIGNIFICANT CHANGE UP (ref 0–0.9)
MONOCYTES NFR BLD AUTO: 10.7 % — SIGNIFICANT CHANGE UP (ref 2–14)
NEUTROPHILS # BLD AUTO: 4.13 K/UL — SIGNIFICANT CHANGE UP (ref 1.8–7.4)
NEUTROPHILS NFR BLD AUTO: 73.5 % — SIGNIFICANT CHANGE UP (ref 43–77)
NRBC # FLD: 0 K/UL — LOW (ref 25–125)
PLATELET # BLD AUTO: 133 K/UL — LOW (ref 150–400)
PLATELET COUNT - ESTIMATE: SIGNIFICANT CHANGE UP
PMV BLD: 12 FL — SIGNIFICANT CHANGE UP (ref 7–13)
POIKILOCYTOSIS BLD QL AUTO: SLIGHT — SIGNIFICANT CHANGE UP
POTASSIUM SERPL-MCNC: 4.1 MMOL/L — SIGNIFICANT CHANGE UP (ref 3.5–5.3)
POTASSIUM SERPL-SCNC: 4.1 MMOL/L — SIGNIFICANT CHANGE UP (ref 3.5–5.3)
PROTHROM AB SERPL-ACNC: 12.9 SEC — SIGNIFICANT CHANGE UP (ref 9.8–13.1)
RBC # BLD: 4.98 M/UL — SIGNIFICANT CHANGE UP (ref 4.2–5.8)
RBC # FLD: 16.4 % — HIGH (ref 10.3–14.5)
SODIUM SERPL-SCNC: 138 MMOL/L — SIGNIFICANT CHANGE UP (ref 135–145)
WBC # BLD: 5.62 K/UL — SIGNIFICANT CHANGE UP (ref 3.8–10.5)
WBC # FLD AUTO: 5.62 K/UL — SIGNIFICANT CHANGE UP (ref 3.8–10.5)

## 2019-03-24 PROCEDURE — 93010 ELECTROCARDIOGRAM REPORT: CPT

## 2019-03-24 RX ADMIN — LOSARTAN POTASSIUM 25 MILLIGRAM(S): 100 TABLET, FILM COATED ORAL at 05:52

## 2019-03-24 RX ADMIN — Medication 2000 UNIT(S): at 12:59

## 2019-03-24 RX ADMIN — Medication 6 UNIT(S): at 12:59

## 2019-03-24 RX ADMIN — Medication 2: at 08:50

## 2019-03-24 RX ADMIN — Medication 2: at 12:59

## 2019-03-24 RX ADMIN — CARVEDILOL PHOSPHATE 12.5 MILLIGRAM(S): 80 CAPSULE, EXTENDED RELEASE ORAL at 05:52

## 2019-03-24 RX ADMIN — Medication 6 UNIT(S): at 17:39

## 2019-03-24 RX ADMIN — CARVEDILOL PHOSPHATE 12.5 MILLIGRAM(S): 80 CAPSULE, EXTENDED RELEASE ORAL at 17:40

## 2019-03-24 RX ADMIN — INSULIN GLARGINE 12 UNIT(S): 100 INJECTION, SOLUTION SUBCUTANEOUS at 21:48

## 2019-03-24 RX ADMIN — Medication 1: at 17:39

## 2019-03-24 RX ADMIN — Medication 6 UNIT(S): at 08:49

## 2019-03-24 RX ADMIN — Medication 2 MILLIGRAM(S): at 21:48

## 2019-03-24 RX ADMIN — ATORVASTATIN CALCIUM 20 MILLIGRAM(S): 80 TABLET, FILM COATED ORAL at 21:48

## 2019-03-24 RX ADMIN — Medication 112 MICROGRAM(S): at 05:52

## 2019-03-24 NOTE — PROGRESS NOTE ADULT - PROBLEM SELECTOR PLAN 8
Continue with Lipitor daily   low cholesterol diet

## 2019-03-24 NOTE — PROGRESS NOTE ADULT - PROBLEM SELECTOR PLAN 9
pharmacological prophylaxis contraindicated 2' intracerebral bleed

## 2019-03-24 NOTE — PROGRESS NOTE ADULT - PROBLEM SELECTOR PLAN 7
Continue with Synthroid daily   TSH level nl
Continue with Synthroid daily   TSH level in am
Continue with Synthroid daily   TSH level nl

## 2019-03-24 NOTE — PROGRESS NOTE ADULT - PROBLEM SELECTOR PLAN 5
[id number] Score of 7 and patient on Coumadin for anticoagulation. INR on admission was 2.9 and patient received IV DDAVP and Kcentra due to acute bleed seen on CT head. Repeat INR noted to be 1.31   Continue with Coreg 12.5mg BID for rate control   Will check INR with morning labs
[id number] Score of 7 and patient on Coumadin for anticoagulation. INR on admission was 2.9 and patient received IV DDAVP and Kcentra due to acute bleed seen on CT head. Repeat INR noted to be 1.31   Continue with Coreg 12.5mg BID for rate control   Will check INR with morning labs
[id number] Score of 7 and patient on Coumadin for anticoagulation. INR on admission was 2.9 and patient received IV DDAVP and Kcentra due to acute bleed seen on CT head. Repeat INR noted to be 1.31   Continue with Coreg 12.5mg BID for rate control   Coumadin on hold for 2 weeks

## 2019-03-24 NOTE — PROGRESS NOTE ADULT - ATTENDING COMMENTS
[facility] NEPHROLOGY  Kang Arceo M.D.  Ilia Melgar D.O.  Cata Velásquez M.D.  Heike Guaman, MSN, ANP-C    Telephone: [phone]  Facsimile: [phone]    [address] [place] [address]

## 2019-03-24 NOTE — PROGRESS NOTE ADULT - PROBLEM SELECTOR PROBLEM 3
Uncontrolled type 2 diabetes mellitus with hyperglycemia

## 2019-03-24 NOTE — PROGRESS NOTE ADULT - PROBLEM SELECTOR PLAN 3
On insulin sliding scale(humalog), Lantus 12Units QHS  FS TID at bedtime

## 2019-03-24 NOTE — PROGRESS NOTE ADULT - PROBLEM SELECTOR PLAN 1
Initial CT head revealed " There is a 1.5 x 2.0 cm hematoma in the left frontal lobe, with surrounding vasogenic edema.  There is extension of the hemorrhage into the subarachnoid space. Additional 1.9 cm x 1.3 cm hematoma in the right temporal occipital region (2, 12) with vasogenic edema.  While findings may be post traumatic in nature, the possibility of hemorrhage into underlying lesions cannot be noted". Patient s/p DDAVP and Kcentra for elevated INR   Patient was seen by MICU and Neurosurgery and their recommendations appreciated  Repeat CT head in 6 hours from initial CT revealed "No interval change. No new hemorrhage or midline shift. Left frontal and right temporal occipital hemorrhages as above"  CT head in 24 hours showed stability  MRI brain w/o contrast cannot definitely rule out hemorrhagic contusions vs masses  Seizure/fall/aspiration precautions ordered   Neuro checks q4hrs   PT/OT; appreciate PMR  Speech and swallow ordered, placed on dysphagia puree diet   Will need to call neurology consult in am
Initial CT head revealed " There is a 1.5 x 2.0 cm hematoma in the left frontal lobe, with surrounding vasogenic edema.  There is extension of the hemorrhage into the subarachnoid space. Additional 1.9 cm x 1.3 cm hematoma in the right temporal occipital region (2, 12) with vasogenic edema.  While findings may be post traumatic in nature, the possibility of hemorrhage into underlying lesions cannot be noted". Patient s/p DDAVP and Kcentra for elevated INR   Patient was seen by MICU and Neurosurgery and their recommendations appreciated  Repeat CT head in 6 hours from initial CT revealed "No interval change. No new hemorrhage or midline shift. Left frontal and right temporal occipital hemorrhages as above"  CT head in 24 hours showed stability  MRI brain w/o contrast cannot definitely rule out hemorrhagic contusions vs masses  Seizure/fall/aspiration precautions   PT/OT; appreciate PMR  Speech and swallow ordered, placed on dysphagia puree diet   ASA on hold for one week, coumadin off for two weeks
Initial CT head revealed " There is a 1.5 x 2.0 cm hematoma in the left frontal lobe, with surrounding vasogenic edema.  There is extension of the hemorrhage into the subarachnoid space. Additional 1.9 cm x 1.3 cm hematoma in the right temporal occipital region (2, 12) with vasogenic edema.  While findings may be post traumatic in nature, the possibility of hemorrhage into underlying lesions cannot be noted". Patient s/p DDAVP and Kcentra for elevated INR   Patient was seen by MICU and Neurosurgery and their recommendations appreciated  Repeat CT head in 6 hours from initial CT revealed "No interval change. No new hemorrhage or midline shift. Left frontal and right temporal occipital hemorrhages as above"  CT head in 24 hours showed stability  MRI brain w/o contrast cannot definitely rule out hemorrhagic contusions vs masses  Seizure/fall/aspiration precautions ordered   Neuro checks q4hrs   PT/OT; appreciate PMR  Speech and swallow ordered, placed on dysphagia puree diet   ASA on hold for one week, coumadin off for two weeks  Will need to call neurology consult in am

## 2019-03-25 ENCOUNTER — TRANSCRIPTION ENCOUNTER (OUTPATIENT)
Age: 79
End: 2019-03-25

## 2019-03-25 VITALS
DIASTOLIC BLOOD PRESSURE: 63 MMHG | SYSTOLIC BLOOD PRESSURE: 115 MMHG | OXYGEN SATURATION: 100 % | RESPIRATION RATE: 18 BRPM | TEMPERATURE: 98 F | HEART RATE: 77 BPM

## 2019-03-25 LAB
ANION GAP SERPL CALC-SCNC: 10 MMO/L — SIGNIFICANT CHANGE UP (ref 7–14)
ANION GAP SERPL CALC-SCNC: 11 MMO/L — SIGNIFICANT CHANGE UP (ref 7–14)
BASOPHILS # BLD AUTO: 0.04 K/UL — SIGNIFICANT CHANGE UP (ref 0–0.2)
BASOPHILS NFR BLD AUTO: 0.6 % — SIGNIFICANT CHANGE UP (ref 0–2)
BUN SERPL-MCNC: 47 MG/DL — HIGH (ref 7–23)
BUN SERPL-MCNC: 48 MG/DL — HIGH (ref 7–23)
CALCIUM SERPL-MCNC: 9.6 MG/DL — SIGNIFICANT CHANGE UP (ref 8.4–10.5)
CALCIUM SERPL-MCNC: 9.6 MG/DL — SIGNIFICANT CHANGE UP (ref 8.4–10.5)
CHLORIDE SERPL-SCNC: 103 MMOL/L — SIGNIFICANT CHANGE UP (ref 98–107)
CHLORIDE SERPL-SCNC: 104 MMOL/L — SIGNIFICANT CHANGE UP (ref 98–107)
CO2 SERPL-SCNC: 25 MMOL/L — SIGNIFICANT CHANGE UP (ref 22–31)
CO2 SERPL-SCNC: 26 MMOL/L — SIGNIFICANT CHANGE UP (ref 22–31)
CREAT SERPL-MCNC: 3.18 MG/DL — HIGH (ref 0.5–1.3)
CREAT SERPL-MCNC: 3.2 MG/DL — HIGH (ref 0.5–1.3)
EOSINOPHIL # BLD AUTO: 0.21 K/UL — SIGNIFICANT CHANGE UP (ref 0–0.5)
EOSINOPHIL NFR BLD AUTO: 3.3 % — SIGNIFICANT CHANGE UP (ref 0–6)
GLUCOSE BLDC GLUCOMTR-MCNC: 130 MG/DL — HIGH (ref 70–99)
GLUCOSE BLDC GLUCOMTR-MCNC: 217 MG/DL — HIGH (ref 70–99)
GLUCOSE SERPL-MCNC: 110 MG/DL — HIGH (ref 70–99)
GLUCOSE SERPL-MCNC: 115 MG/DL — HIGH (ref 70–99)
HCT VFR BLD CALC: 36.5 % — LOW (ref 39–50)
HGB BLD-MCNC: 10.7 G/DL — LOW (ref 13–17)
IMM GRANULOCYTES NFR BLD AUTO: 0.5 % — SIGNIFICANT CHANGE UP (ref 0–1.5)
INR BLD: 1.11 — SIGNIFICANT CHANGE UP (ref 0.88–1.17)
LYMPHOCYTES # BLD AUTO: 1.02 K/UL — SIGNIFICANT CHANGE UP (ref 1–3.3)
LYMPHOCYTES # BLD AUTO: 16.2 % — SIGNIFICANT CHANGE UP (ref 13–44)
MAGNESIUM SERPL-MCNC: 2 MG/DL — SIGNIFICANT CHANGE UP (ref 1.6–2.6)
MAGNESIUM SERPL-MCNC: 2 MG/DL — SIGNIFICANT CHANGE UP (ref 1.6–2.6)
MCHC RBC-ENTMCNC: 21 PG — LOW (ref 27–34)
MCHC RBC-ENTMCNC: 29.3 % — LOW (ref 32–36)
MCV RBC AUTO: 71.7 FL — LOW (ref 80–100)
MONOCYTES # BLD AUTO: 0.74 K/UL — SIGNIFICANT CHANGE UP (ref 0–0.9)
MONOCYTES NFR BLD AUTO: 11.7 % — SIGNIFICANT CHANGE UP (ref 2–14)
NEUTROPHILS # BLD AUTO: 4.27 K/UL — SIGNIFICANT CHANGE UP (ref 1.8–7.4)
NEUTROPHILS NFR BLD AUTO: 67.7 % — SIGNIFICANT CHANGE UP (ref 43–77)
NRBC # FLD: 0 K/UL — LOW (ref 25–125)
PLATELET # BLD AUTO: 153 K/UL — SIGNIFICANT CHANGE UP (ref 150–400)
PMV BLD: 11.8 FL — SIGNIFICANT CHANGE UP (ref 7–13)
POTASSIUM SERPL-MCNC: 4.1 MMOL/L — SIGNIFICANT CHANGE UP (ref 3.5–5.3)
POTASSIUM SERPL-MCNC: 4.5 MMOL/L — SIGNIFICANT CHANGE UP (ref 3.5–5.3)
POTASSIUM SERPL-SCNC: 4.1 MMOL/L — SIGNIFICANT CHANGE UP (ref 3.5–5.3)
POTASSIUM SERPL-SCNC: 4.5 MMOL/L — SIGNIFICANT CHANGE UP (ref 3.5–5.3)
PROTHROM AB SERPL-ACNC: 12.4 SEC — SIGNIFICANT CHANGE UP (ref 9.8–13.1)
RBC # BLD: 5.09 M/UL — SIGNIFICANT CHANGE UP (ref 4.2–5.8)
RBC # FLD: 16.5 % — HIGH (ref 10.3–14.5)
SODIUM SERPL-SCNC: 139 MMOL/L — SIGNIFICANT CHANGE UP (ref 135–145)
SODIUM SERPL-SCNC: 140 MMOL/L — SIGNIFICANT CHANGE UP (ref 135–145)
WBC # BLD: 6.31 K/UL — SIGNIFICANT CHANGE UP (ref 3.8–10.5)
WBC # FLD AUTO: 6.31 K/UL — SIGNIFICANT CHANGE UP (ref 3.8–10.5)

## 2019-03-25 RX ORDER — WARFARIN SODIUM 2.5 MG/1
1 TABLET ORAL
Qty: 0 | Refills: 0 | COMMUNITY

## 2019-03-25 RX ORDER — INSULIN LISPRO 100/ML
6 VIAL (ML) SUBCUTANEOUS
Qty: 0 | Refills: 0 | COMMUNITY

## 2019-03-25 RX ORDER — ASPIRIN/CALCIUM CARB/MAGNESIUM 324 MG
1 TABLET ORAL
Qty: 0 | Refills: 0 | COMMUNITY

## 2019-03-25 RX ORDER — INSULIN ASPART 100 [IU]/ML
0 INJECTION, SOLUTION SUBCUTANEOUS
Qty: 0 | Refills: 0 | COMMUNITY

## 2019-03-25 RX ADMIN — LOSARTAN POTASSIUM 25 MILLIGRAM(S): 100 TABLET, FILM COATED ORAL at 06:01

## 2019-03-25 RX ADMIN — Medication 2000 UNIT(S): at 12:52

## 2019-03-25 RX ADMIN — CARVEDILOL PHOSPHATE 12.5 MILLIGRAM(S): 80 CAPSULE, EXTENDED RELEASE ORAL at 06:01

## 2019-03-25 RX ADMIN — Medication 6 UNIT(S): at 08:58

## 2019-03-25 RX ADMIN — Medication 112 MICROGRAM(S): at 06:01

## 2019-03-25 RX ADMIN — Medication 2: at 12:52

## 2019-03-25 RX ADMIN — Medication 6 UNIT(S): at 12:52

## 2019-03-25 RX ADMIN — CALCITRIOL 0.25 MICROGRAM(S): 0.5 CAPSULE ORAL at 08:58

## 2019-03-25 NOTE — PROGRESS NOTE ADULT - SUBJECTIVE AND OBJECTIVE BOX
[facility] NEPHROLOGY- PROGRESS NOTE    79y Male with history of HTN, DM, ICM presents with RUE weakness found to have R temporal and L frontal hematomas. Nephrology consulted for elevated Scr.    REVIEW OF SYSTEMS:  Gen: no changes in weight  Cards: no chest pain  Resp: no dyspnea  GI: no nausea or vomiting or diarrhea  Vascular: no LE edema, + RUE weakness    No Known Allergies      Hospital Medications: Medications reviewed    VITALS:  T(F): 98 (03-23-19 @ 08:30), Max: 98.5 (03-22-19 @ 21:29)  HR: 84 (03-23-19 @ 08:30)  BP: 145/80 (03-23-19 @ 08:30)  RR: 18 (03-23-19 @ 08:30)  SpO2: 99% (03-23-19 @ 08:30)  Wt(kg): --    Weight (kg): 100 (03-20 @ 17:17)      PHYSICAL EXAM:    Gen: NAD, calm  Cards: Irregularly irregular, +S1/S2, no M/G/R  Resp: CTA B/L  GI: soft, NT/ND, NABS  Vascular: no LE edema B/L    LABS:  03-23    138  |  105  |  47<H>  ----------------------------<  343<H>  4.2   |  23  |  3.14<H>    Ca    9.4      23 Mar 2019 05:42  Phos  3.0     03-23  Mg     2.0     03-23      Creatinine Trend: 3.14 <--, 2.98 <--, 3.11 <--, 3.41 <--                        10.0   5.68  )-----------( 135      ( 23 Mar 2019 05:42 )             33.9
No acute CP or SOB    Vital Signs Last 24 Hrs  T(C): 36.8 (24 Mar 2019 09:13), Max: 36.8 (23 Mar 2019 19:45)  T(F): 98.3 (24 Mar 2019 09:13), Max: 98.3 (23 Mar 2019 19:45)  HR: 89 (24 Mar 2019 09:13) (78 - 89)  BP: 131/68 (24 Mar 2019 09:13) (119/61 - 140/76)  BP(mean): --  RR: 18 (24 Mar 2019 09:13) (18 - 18)  SpO2: 100% (24 Mar 2019 09:13) (96% - 100%)    GENERAL: NAD, AAOx3  HEAD:  Atraumatic, Normocephalic  EYES: EOMI, PERRLA, conjunctiva and sclera clear  NECK: Supple, No JVD, No LAD  CHEST/LUNG: Clear to auscultation bilaterally; No wheeze  HEART: Irregular rate and rhythm; No murmurs, rubs, or gallops  ABDOMEN: Soft, Nontender, Nondistended; Bowel sounds present  EXTREMITIES:  2+ Peripheral Pulses, 1+ b/l distal LE edema  SKIN: No rashes or lesions  NEURO: 4/5 rt UE motor    LABS:                        10.4   5.62  )-----------( 133      ( 24 Mar 2019 05:30 )             35.8     03-24    138  |  104  |  45<H>  ----------------------------<  228<H>  4.1   |  23  |  2.97<H>    Ca    9.5      24 Mar 2019 05:30  Phos  3.0     03-23  Mg     1.9     03-24      PT/INR - ( 24 Mar 2019 05:30 )   PT: 12.9 SEC;   INR: 1.16            CAPILLARY BLOOD GLUCOSE      POCT Blood Glucose.: 228 mg/dL (24 Mar 2019 08:39)  POCT Blood Glucose.: 246 mg/dL (23 Mar 2019 21:50)  POCT Blood Glucose.: 191 mg/dL (23 Mar 2019 17:47)  POCT Blood Glucose.: 287 mg/dL (23 Mar 2019 12:44)
[facility] NEPHROLOGY- PROGRESS NOTE    79y Male with history of HTN, DM, ICM presents with RUE weakness found to have R temporal and L frontal hematomas. Nephrology consulted for elevated Scr.    REVIEW OF SYSTEMS:  Gen: no changes in weight  Cards: no chest pain  Resp: no dyspnea  GI: no nausea or vomiting or diarrhea  Vascular: no LE edema, + RUE weakness    No Known Allergies      Hospital Medications: Medications reviewed      VITALS:  T(F): 98.3 (03-24-19 @ 09:13), Max: 98.3 (03-23-19 @ 19:45)  HR: 89 (03-24-19 @ 09:13)  BP: 131/68 (03-24-19 @ 09:13)  RR: 18 (03-24-19 @ 09:13)  SpO2: 100% (03-24-19 @ 09:13)  Wt(kg): --        PHYSICAL EXAM:    Gen: NAD, calm  Cards: Irregularly irregular, +S1/S2, no M/G/R  Resp: CTA B/L  GI: soft, NT/ND, NABS  Vascular: no LE edema B/L      LABS:  03-24    138  |  104  |  45<H>  ----------------------------<  228<H>  4.1   |  23  |  2.97<H>    Ca    9.5      24 Mar 2019 05:30  Phos  3.0     03-23  Mg     1.9     03-24      Creatinine Trend: 2.97 <--, 3.14 <--, 2.98 <--, 3.11 <--, 3.41 <--                        10.4   5.62  )-----------( 133      ( 24 Mar 2019 05:30 )             35.8     Urine Studies:
[facility] NEPHROLOGY- PROGRESS NOTE    79y Male with history of HTN, DM, ICM presents with RUE weakness found to have R temporal and L frontal hematomas. Nephrology consulted for elevated Scr.    REVIEW OF SYSTEMS:  Gen: no changes in weight  Cards: no chest pain  Resp: no dyspnea  GI: no nausea or vomiting or diarrhea  Vascular: no LE edema, + RUE weakness    No Known Allergies      Hospital Medications: Medications reviewed      VITALS:  T(F): 97.8 (03-25-19 @ 05:38), Max: 98.1 (03-24-19 @ 21:45)  HR: 82 (03-25-19 @ 11:14)  BP: 131/71 (03-25-19 @ 05:38)  RR: 18 (03-25-19 @ 05:38)  SpO2: 96% (03-25-19 @ 11:14)  Wt(kg): --        PHYSICAL EXAM:    Gen: NAD, calm  Cards: Irregularly irregular, +S1/S2, no M/G/R  Resp: CTA B/L  GI: soft, NT/ND, NABS  Vascular: trace LE edema B/L      LABS:  03-25    140  |  104  |  47<H>  ----------------------------<  115<H>  4.1   |  25  |  3.18<H>    Ca    9.6      25 Mar 2019 06:11  Mg     2.0     03-25      Creatinine Trend: 3.18 <--, 3.20 <--, 2.97 <--, 3.14 <--, 2.98 <--, 3.11 <--, 3.41 <--                        10.7   6.31  )-----------( 153      ( 25 Mar 2019 06:11 )             36.5     Urine Studies:
Headache improved compared to yesterday    Vital Signs Last 24 Hrs  T(C): 36.6 (23 Mar 2019 12:30), Max: 36.9 (22 Mar 2019 21:29)  T(F): 97.8 (23 Mar 2019 12:30), Max: 98.5 (22 Mar 2019 21:29)  HR: 87 (23 Mar 2019 12:30) (68 - 97)  BP: 119/61 (23 Mar 2019 12:30) (119/61 - 150/59)  BP(mean): --  RR: 18 (23 Mar 2019 12:30) (17 - 19)  SpO2: 100% (23 Mar 2019 12:30) (98% - 100%)    GENERAL: NAD, AAOx3  HEAD:  Atraumatic, Normocephalic  EYES: EOMI, PERRLA, conjunctiva and sclera clear  NECK: Supple, No JVD, No LAD  CHEST/LUNG: Clear to auscultation bilaterally; No wheeze  HEART: Irregular rate and rhythm; No murmurs, rubs, or gallops  ABDOMEN: Soft, Nontender, Nondistended; Bowel sounds present  EXTREMITIES:  2+ Peripheral Pulses, 1+ b/l distal LE edema  SKIN: No rashes or lesions  NEURO: 4/5 rt UE motor    LABS:                        10.0   5.68  )-----------( 135      ( 23 Mar 2019 05:42 )             33.9     03-23    138  |  105  |  47<H>  ----------------------------<  343<H>  4.2   |  23  |  3.14<H>    Ca    9.4      23 Mar 2019 05:42  Phos  3.0     03-23  Mg     2.0     03-23      PT/INR - ( 23 Mar 2019 05:42 )   PT: 11.8 SEC;   INR: 1.03          PTT - ( 22 Mar 2019 05:40 )  PTT:26.8 SEC  CAPILLARY BLOOD GLUCOSE      POCT Blood Glucose.: 287 mg/dL (23 Mar 2019 12:44)  POCT Blood Glucose.: 365 mg/dL (23 Mar 2019 09:01)  POCT Blood Glucose.: 275 mg/dL (22 Mar 2019 21:02)
Patient is a 79y old  Male who presents with a chief complaint of Sent by neurologist to r/o ICH (22 Mar 2019 10:06)      SUBJECTIVE / OVERNIGHT EVENTS:        Vital Signs Last 24 Hrs  T(C): 36.7 (22 Mar 2019 10:35), Max: 37.1 (21 Mar 2019 21:49)  T(F): 98 (22 Mar 2019 10:35), Max: 98.8 (21 Mar 2019 21:49)  HR: 82 (22 Mar 2019 10:35) (61 - 94)  BP: 157/67 (22 Mar 2019 10:35) (128/63 - 157/67)  BP(mean): --  RR: 18 (22 Mar 2019 10:35) (16 - 18)  SpO2: 100% (22 Mar 2019 10:35) (95% - 100%)  I&O's Summary      GENERAL: NAD, AAOx3  HEAD:  Atraumatic, Normocephalic  EYES: EOMI, PERRLA, conjunctiva and sclera clear  NECK: Supple, No JVD, No LAD  CHEST/LUNG: Clear to auscultation bilaterally; No wheeze  HEART: Irregular rate and rhythm; No murmurs, rubs, or gallops  ABDOMEN: Soft, Nontender, Nondistended; Bowel sounds present  EXTREMITIES:  2+ Peripheral Pulses, 1+ b/l distal LE edema  SKIN: No rashes or lesions  NEURO: 4/5 rt UE motor    LABS:                        10.3   6.01  )-----------( 142      ( 22 Mar 2019 05:35 )             35.0     03-22    140  |  105  |  43<H>  ----------------------------<  206<H>  3.9   |  23  |  2.98<H>    Ca    9.6      22 Mar 2019 05:41  Phos  2.5     03-21  Mg     1.9     03-21    TPro  6.8  /  Alb  3.6  /  TBili  0.4  /  DBili  x   /  AST  20  /  ALT  20  /  AlkPhos  72  03-20    PT/INR - ( 22 Mar 2019 05:40 )   PT: 11.9 SEC;   INR: 1.04          PTT - ( 22 Mar 2019 05:40 )  PTT:26.8 SEC  CAPILLARY BLOOD GLUCOSE      POCT Blood Glucose.: 230 mg/dL (22 Mar 2019 09:31)  POCT Blood Glucose.: 312 mg/dL (21 Mar 2019 21:14)  POCT Blood Glucose.: 360 mg/dL (21 Mar 2019 17:27)  POCT Blood Glucose.: 254 mg/dL (21 Mar 2019 13:17)            RADIOLOGY & ADDITIONAL TESTS:    Imaging Personally Reviewed:  [x] YES  [ ] NO    Consultant(s) Notes Reviewed:  [x] YES  [ ] NO      MEDICATIONS  (STANDING):  atorvastatin 20 milliGRAM(s) Oral at bedtime  calcitriol   Capsule 0.25 MICROGram(s) Oral daily  carvedilol 12.5 milliGRAM(s) Oral every 12 hours  dextrose 5%. 1000 milliLiter(s) (50 mL/Hr) IV Continuous <Continuous>  dextrose 50% Injectable 12.5 Gram(s) IV Push once  dextrose 50% Injectable 25 Gram(s) IV Push once  dextrose 50% Injectable 25 Gram(s) IV Push once  doxazosin 2 milliGRAM(s) Oral at bedtime  insulin glargine Injectable (LANTUS) 12 Unit(s) SubCutaneous at bedtime  insulin lispro (HumaLOG) corrective regimen sliding scale   SubCutaneous three times a day before meals  insulin lispro (HumaLOG) corrective regimen sliding scale   SubCutaneous at bedtime  insulin lispro Injectable (HumaLOG) 6 Unit(s) SubCutaneous three times a day before meals  levothyroxine 112 MICROGram(s) Oral daily    MEDICATIONS  (PRN):  acetaminophen   Tablet .. 650 milliGRAM(s) Oral every 6 hours PRN Mild Pain (1 - 3), Moderate Pain (4 - 6)  dextrose 40% Gel 15 Gram(s) Oral once PRN Blood Glucose LESS THAN 70 milliGRAM(s)/deciliter  glucagon  Injectable 1 milliGRAM(s) IntraMuscular once PRN Glucose LESS THAN 70 milligrams/deciliter      Care Discussed with Consultants/Other Providers [x] YES  [ ] NO    HEALTH ISSUES - PROBLEM Dx:  Need for prophylactic measure: Need for prophylactic measure  HLD (hyperlipidemia): HLD (hyperlipidemia)  Hypothyroidism: Hypothyroidism  Essential hypertension: Essential hypertension  Chronic atrial fibrillation: Chronic atrial fibrillation  CKD (chronic kidney disease), stage IV: CKD (chronic kidney disease), stage IV  Uncontrolled type 2 diabetes mellitus with hyperglycemia: Uncontrolled type 2 diabetes mellitus with hyperglycemia  Anemia: Anemia  Intracranial hemorrhage: Intracranial hemorrhage

## 2019-03-25 NOTE — DISCHARGE NOTE NURSING/CASE MANAGEMENT/SOCIAL WORK - NSDCDPATPORTLINK_GEN_ALL_CORE
You can access the [facility] Portal, offered by [facility], by registering with the following website: [email or web address]

## 2019-03-25 NOTE — DISCHARGE NOTE PROVIDER - CARE PROVIDERS DIRECT ADDRESSES
,DirectAddress_Unknown,[email or web address]

## 2019-03-25 NOTE — DISCHARGE NOTE PROVIDER - NSDCCPCAREPLAN_GEN_ALL_CORE_FT
PRINCIPAL DISCHARGE DIAGNOSIS  Diagnosis: Intracranial hemorrhage  Assessment and Plan of Treatment: Follow up with Neurosurgery in 1-2 weeks  Only Restart Aspirin on 3/29  Only Restart Coumadin on 4/5      SECONDARY DISCHARGE DIAGNOSES  Diagnosis: Diabetes mellitus type II, controlled  Assessment and Plan of Treatment: Monitor finger sticks pre-meal and bedtime, low salt, fat and carbohydrate diet, minimize glucose intake.  Exercise daily for at least 30 minutes and weight loss.  Follow up with primary care physician and endocrinologist for routine Hemoglobin A1C checks and management.  Follow up with your ophthalmologist for routine yearly vision exams.      Diagnosis: CKD (chronic kidney disease), stage IV  Assessment and Plan of Treatment: Continue blood pressure, cholesterol and diabetic medications. Goal of hemoglobin A1C (HgbA1C) < 7%.  Avoid nephrotoxic drugs such as nonsteroidal anti-inflammatory agents (NSAIDs).   Please follow up with your nephrologist to monitor your kidney function, continue with low protein and potassium diet.      Diagnosis: Anemia  Assessment and Plan of Treatment: Continue to take current medications as prescribed.  Follow up your routine physician's appointments.  If you notice any bleeding, please notify your doctor immediately or go to the nearest emergency room.      Diagnosis: CAD (coronary artery disease)  Assessment and Plan of Treatment: ONly resume Aspirin 81 mg on  3/29. Continue low salt, fat, cholesterol and carbohydrate diet. Follow up with cardiologist and primary care physician's routine appointment.      Diagnosis: HLD (hyperlipidemia)  Assessment and Plan of Treatment: Low fat diet, exercise daily and continue current medications. Follow up with primary care physician and cardiologist for management.      Diagnosis: Essential hypertension  Assessment and Plan of Treatment: Low sodium and fat diet, continue anti-hypertensive medications, and follow up with primary care physician.      Diagnosis: Chronic atrial fibrillation  Assessment and Plan of Treatment: Due to your recent intracranial Bleed, Only Restart  coumadin on 4/5, colin Close Pt/INR Monitoring   follow with your physician to monitor your levels.  Low fat diet, reduce caffeine intake, and exercise at least 30 minutes daily.

## 2019-03-25 NOTE — PROGRESS NOTE ADULT - ATTENDING COMMENTS
[facility] NEPHROLOGY  Kang Arceo M.D.  Ilia Melgar D.O.  Cata Velásquez M.D.  Heike Guaman, MSN, ANP-C    Telephone: [phone]  Facsimile: [phone]    [address] [place] [address]

## 2019-03-25 NOTE — DISCHARGE NOTE PROVIDER - HOSPITAL COURSE
80 y/o M with PMH of CKD stage IV, CAD(s/p 16 stents), Afib(on Coumadin), TIA, BPH, Hypothyroidism was sent in by neurologist for RUE and RLE weakness 2/2 to recent fall.         +There is a 1.5 x 2.0 cm hematoma in the left frontal lobe, with surrounding vasogenic edema. There is extension of the hemorrhage into the subarachnoid space-being followed by Neurosurgery and MICU          Problem/Plan - 1:    ·  Problem: Intracranial hemorrhage.  Plan: Initial CT head revealed " There is a 1.5 x 2.0 cm hematoma in the left frontal lobe, with surrounding vasogenic edema.  There is extension of the hemorrhage into the subarachnoid space. Additional 1.9 cm x 1.3 cm hematoma in the right temporal occipital region (2, 12) with vasogenic edema.  While findings may be post traumatic in nature, the possibility of hemorrhage into underlying lesions cannot be noted". Patient s/p DDAVP and Kcentra for elevated INR     Patient was seen by MICU and Neurosurgery and their recommendations appreciated    Repeat CT head in 6 hours from initial CT revealed "No interval change. No new hemorrhage or midline shift. Left frontal and right temporal occipital hemorrhages as above"    CT head in 24 hours showed stability    MRI brain w/o contrast cannot definitely rule out hemorrhagic contusions vs masses    Seizure/fall/aspiration precautions     PT/OT; appreciate PMR    Speech and swallow ordered, placed on dysphagia puree diet     ASA on hold for one week, coumadin off for two weeks.          Problem/Plan - 2:    ·  Problem: Anemia.  Plan: Keep Hgb above 7.0.          Problem/Plan - 3:    ·  Problem: Uncontrolled type 2 diabetes mellitus with hyperglycemia.  Plan: On insulin sliding scale(humalog), Lantus 12Units QHS    FS TID at bedtime.          Problem/Plan - 4:    ·  Problem: CKD (chronic kidney disease), stage IV.  Plan: BUN/Cr: 43/3.41 likely 2/2 to underline DM     Will monitor for now     Renal diet ordered     Avoid nephrotoxic medications.          Problem/Plan - 5:    ·  Problem: Chronic atrial fibrillation.  Plan: [id number] Score of 7 and patient on Coumadin for anticoagulation. INR on admission was 2.9 and patient received IV DDAVP and Kcentra due to acute bleed seen on CT head. Repeat INR noted to be 1.31     Continue with Coreg 12.5mg BID for rate control     Coumadin on hold for 2 weeks.          Problem/Plan - 6:    Problem: Essential hypertension. Plan: Continue with antihypertensive medications     DASH diet recommended.         Problem/Plan - 7:    ·  Problem: Hypothyroidism.  Plan: Continue with Synthroid daily     TSH level nl.          Problem/Plan - 8:    ·  Problem: HLD (hyperlipidemia).  Plan: Continue with Lipitor daily     low cholesterol diet.     Case discussed with attending, Pt is Stable for discharge Home.

## 2019-03-25 NOTE — PROGRESS NOTE ADULT - ASSESSMENT
79y Male with history of HTN, DM, ICM presents with RUE weakness found to have R temporal and L frontal hematomas. Nephrology consulted for elevated Scr.    1) CKD-4: Patient with CKD-4 with proteinuria with baseline Scr 3-3.2 for which patient follows with me as an outpatient. Renal function at baseline. Avoid nephrotoxins. Monitor electrolytes.  2) HTN with CKD: BP improving after losartan restarted (goal BP < 140/90 as per neuro). Continue with current medications and low sodium diet. Monitor BP.  3) LE edema: Minimal at this time. Plan to resume home lasix 20 mg daily on discharge. Monitor UO.  4) Secondary HPT of renal origin: Continue with calcitriol MWF (home schedule) and cholecalciferol 2000 IU daily. Monitor serum calcium and phosphorus.
80 y/o M with PMH of CKD stage IV, CAD(s/p 16 stents), Afib(on Coumadin), TIA, BPH, Hypothyroidism was sent in by neurologist for RUE and RLE weakness 2/2 to recent fall.     +There is a 1.5 x 2.0 cm hematoma in the left frontal lobe, with surrounding vasogenic edema. There is extension of the hemorrhage into the subarachnoid space-being followed by Neurosurgery and MICU
78 y/o M with PMH of CKD stage IV, CAD(s/p 16 stents), Afib(on Coumadin), TIA, BPH, Hypothyroidism was sent in by neurologist for RUE and RLE weakness 2/2 to recent fall.     +There is a 1.5 x 2.0 cm hematoma in the left frontal lobe, with surrounding vasogenic edema. There is extension of the hemorrhage into the subarachnoid space-being followed by Neurosurgery and MICU
78 y/o M with PMH of CKD stage IV, CAD(s/p 16 stents), Afib(on Coumadin), TIA, BPH, Hypothyroidism was sent in by neurologist for RUE and RLE weakness 2/2 to recent fall.     +There is a 1.5 x 2.0 cm hematoma in the left frontal lobe, with surrounding vasogenic edema. There is extension of the hemorrhage into the subarachnoid space-being followed by Neurosurgery and MICU

## 2019-03-25 NOTE — DISCHARGE NOTE PROVIDER - CARE PROVIDER_API CALL
Ilia Melgar (DO)  Internal Medicine  [address] [place] [address]  Phone: [phone]  Fax: [phone]  Follow Up Time:     Fidencio Ruggiero)  Neurosurgery  General  [address] [place] [address], [facility] [address]  [place] [address]  Phone: [phone]  Fax: [phone]  Follow Up Time:

## 2019-03-25 NOTE — DISCHARGE NOTE NURSING/CASE MANAGEMENT/SOCIAL WORK - NSDPFAC_GEN_ALL_CORE
[facility]: [address] [facility] [address] (p: [phone])

## 2019-05-07 PROBLEM — E03.9 HYPOTHYROIDISM, UNSPECIFIED: Chronic | Status: ACTIVE | Noted: 2019-03-21

## 2019-05-07 PROBLEM — I48.91 UNSPECIFIED ATRIAL FIBRILLATION: Chronic | Status: ACTIVE | Noted: 2019-03-21

## 2019-05-07 PROBLEM — Z87.438 PERSONAL HISTORY OF OTHER DISEASES OF MALE GENITAL ORGANS: Chronic | Status: ACTIVE | Noted: 2019-03-21

## 2019-05-07 PROBLEM — I10 ESSENTIAL (PRIMARY) HYPERTENSION: Chronic | Status: ACTIVE | Noted: 2019-03-21

## 2019-05-07 PROBLEM — E78.5 HYPERLIPIDEMIA, UNSPECIFIED: Chronic | Status: ACTIVE | Noted: 2019-03-21

## 2019-05-13 ENCOUNTER — FORM ENCOUNTER (OUTPATIENT)
Age: 79
End: 2019-05-13

## 2019-05-14 ENCOUNTER — OUTPATIENT (OUTPATIENT)
Dept: OUTPATIENT SERVICES | Facility: HOSPITAL | Age: 79
LOS: 1 days | End: 2019-05-14
Payer: MEDICARE

## 2019-05-14 ENCOUNTER — APPOINTMENT (OUTPATIENT)
Dept: CT IMAGING | Facility: CLINIC | Age: 79
End: 2019-05-14
Payer: MEDICARE

## 2019-05-14 ENCOUNTER — APPOINTMENT (OUTPATIENT)
Dept: NEUROSURGERY | Facility: CLINIC | Age: 79
End: 2019-05-14
Payer: MEDICARE

## 2019-05-14 ENCOUNTER — APPOINTMENT (OUTPATIENT)
Dept: NEUROSURGERY | Facility: CLINIC | Age: 79
End: 2019-05-14

## 2019-05-14 VITALS
HEART RATE: 86 BPM | DIASTOLIC BLOOD PRESSURE: 76 MMHG | WEIGHT: 224 LBS | HEIGHT: 68 IN | BODY MASS INDEX: 33.95 KG/M2 | SYSTOLIC BLOOD PRESSURE: 115 MMHG

## 2019-05-14 DIAGNOSIS — Z00.8 ENCOUNTER FOR OTHER GENERAL EXAMINATION: ICD-10-CM

## 2019-05-14 PROCEDURE — 70450 CT HEAD/BRAIN W/O DYE: CPT

## 2019-05-14 PROCEDURE — 70450 CT HEAD/BRAIN W/O DYE: CPT | Mod: 26

## 2019-05-14 PROCEDURE — 99213 OFFICE O/P EST LOW 20 MIN: CPT

## 2019-05-21 ENCOUNTER — APPOINTMENT (OUTPATIENT)
Dept: NEUROLOGY | Facility: CLINIC | Age: 79
End: 2019-05-21

## 2019-05-22 RX ORDER — ASPIRIN 81 MG
81 TABLET, DELAYED RELEASE (ENTERIC COATED) ORAL
Refills: 0 | Status: ACTIVE | COMMUNITY

## 2019-05-22 RX ORDER — CARVEDILOL 12.5 MG/1
12.5 TABLET, FILM COATED ORAL TWICE DAILY
Refills: 0 | Status: ACTIVE | COMMUNITY

## 2019-05-22 RX ORDER — CALCITRIOL 0.5 UG/1
0.5 CAPSULE, LIQUID FILLED ORAL
Refills: 0 | Status: ACTIVE | COMMUNITY

## 2019-05-22 RX ORDER — INSULIN ASPART 100 [IU]/ML
100 INJECTION, SOLUTION INTRAVENOUS; SUBCUTANEOUS
Refills: 0 | Status: ACTIVE | COMMUNITY

## 2019-05-22 RX ORDER — WARFARIN 5 MG/1
5 TABLET ORAL
Refills: 0 | Status: ACTIVE | COMMUNITY

## 2019-05-22 RX ORDER — LOSARTAN POTASSIUM 25 MG/1
25 TABLET, FILM COATED ORAL DAILY
Refills: 0 | Status: ACTIVE | COMMUNITY

## 2019-05-22 RX ORDER — GLUC/MSM/COLGN2/HYAL/ANTIARTH3 375-375-20
TABLET ORAL
Refills: 0 | Status: ACTIVE | COMMUNITY

## 2019-05-22 RX ORDER — ATORVASTATIN CALCIUM 20 MG/1
20 TABLET, FILM COATED ORAL
Refills: 0 | Status: ACTIVE | COMMUNITY

## 2019-05-22 RX ORDER — DOXAZOSIN 2 MG/1
2 TABLET ORAL
Refills: 0 | Status: ACTIVE | COMMUNITY

## 2019-05-22 RX ORDER — MULTIVIT-MIN/FOLIC/VIT K/LYCOP 400-300MCG
25 MCG TABLET ORAL DAILY
Refills: 0 | Status: ACTIVE | COMMUNITY

## 2019-05-22 RX ORDER — INSULIN GLARGINE 100 [IU]/ML
100 INJECTION, SOLUTION SUBCUTANEOUS
Refills: 0 | Status: ACTIVE | COMMUNITY

## 2019-05-22 RX ORDER — OMEPRAZOLE 20 MG/1
20 CAPSULE, DELAYED RELEASE ORAL
Refills: 0 | Status: ACTIVE | COMMUNITY

## 2019-05-22 RX ORDER — FUROSEMIDE 20 MG/1
20 TABLET ORAL DAILY
Refills: 0 | Status: ACTIVE | COMMUNITY

## 2019-05-22 RX ORDER — WARFARIN 2.5 MG/1
2.5 TABLET ORAL
Refills: 0 | Status: ACTIVE | COMMUNITY

## 2019-05-22 RX ORDER — LEVOTHYROXINE SODIUM 112 UG/1
112 TABLET ORAL DAILY
Refills: 0 | Status: ACTIVE | COMMUNITY

## 2019-05-23 NOTE — ASSESSMENT
[FreeTextEntry1] : Mr. Wall is an 78 yo male with recent ICH on 3/21/91.  Repeat CT today show resolution.  He may f/u with a stroke neurologist, no neurosurgical f/u required.\par \par 1)  No Nsx f/u\par 2)  referral - stroke neurology provided - Dr. Templeton\par \par

## 2019-05-23 NOTE — REVIEW OF SYSTEMS
[As noted in HPI] : as noted in HPI [As Noted in HPI] : as noted in HPI [Negative] : Endocrine [Chest Pain] : no chest pain [Palpitations] : no palpitations [Abdominal Pain] : no abdominal pain [Vomiting] : no vomiting [Diarrhea] : no diarrhea [Dysuria] : no dysuria [Incontinence] : no incontinence [Skin Lesions] : no skin lesions [Skin Wound] : no skin wound [Itching] : no itching [Easy Bleeding] : no tendency for easy bleeding [Easy Bruising] : no tendency for easy bruising [de-identified] : on Coumadin see HPI

## 2019-05-23 NOTE — PHYSICAL EXAM
[General Appearance - Alert] : alert [General Appearance - In No Acute Distress] : in no acute distress [General Appearance - Well Nourished] : well nourished [General Appearance - Well Developed] : well developed [Oriented To Time, Place, And Person] : oriented to person, place, and time [Impaired Insight] : insight and judgment were intact [Affect] : the affect was normal [Mood] : the mood was normal [Cranial Nerves Optic (II)] : visual acuity intact bilaterally,  pupils equal round and reactive to light [Cranial Nerves Oculomotor (III)] : extraocular motion intact [Cranial Nerves Trigeminal (V)] : facial sensation intact symmetrically [Cranial Nerves Facial (VII)] : face symmetrical [Cranial Nerves Vestibulocochlear (VIII)] : hearing was intact bilaterally [Cranial Nerves Glossopharyngeal (IX)] : tongue and palate midline [Cranial Nerves Accessory (XI - Cranial And Spinal)] : head turning and shoulder shrug symmetric [Cranial Nerves Hypoglossal (XII)] : there was no tongue deviation with protrusion [Motor Strength] : muscle strength was normal in all four extremities [Sensation Tactile Decrease] : light touch was intact [Normal] : normal [Intact] : all sensory within normal limits bilaterally [PERRL With Normal Accommodation] : pupils were equal in size, round, reactive to light, with normal accommodation [Hearing Threshold Finger Rub Not [place]] : hearing was normal [Neck Appearance] : the appearance of the neck was normal [] : no respiratory distress [Respiration, Rhythm And Depth] : normal respiratory rhythm and effort [Auscultation Breath Sounds / Voice Sounds] : lungs were clear to auscultation bilaterally [Heart Sounds] : normal S1 and S2 [Edema] : there was no peripheral edema [Abnormal Walk] : normal gait [Involuntary Movements] : no involuntary movements were seen [Motor Tone] : muscle strength and tone were normal [Skin Color & Pigmentation] : normal skin color and pigmentation [Romberg's Sign] : Romberg's sign was negtive [Limited Balance] : balance was intact [Tremor] : no tremor present

## 2019-05-23 NOTE — HISTORY OF PRESENT ILLNESS
[FreeTextEntry1] : Mr. Schulte is an 80 yo male with PMH of CKD stage IV, CAD (s/p 16 stents), Afib (on Coumadin), TIA, BPH, and hypothyroid, who arrives for initial HFU for 3/21/19 ICH (non surgical) s/p fall 3/17/19.  He is asymptomatic and has restarted coumadin on 5/9/19 without our consult.  Resolved RUE, RLE weakness on presentation.  Chronic LLE dorsiflexion 2/5 with distal mild edema.  \par CT obtained today, shows resolution of previous hemorrhage with no new areas of pathology.  He is asymptomatic and feeling well.

## 2019-05-23 NOTE — RESULTS/DATA
[FreeTextEntry1] : Patient Name: DONALD PARK   Report Date: 14-May-2019 15:10.00 \par Patient ID: [id number] (00), [id number] (EPI)  Accession No.: [id number] \par Patient Birth Date: 1940  Report Status: F \par Referring Physician: [id number] JOHNSON ACKERMAN   Reason For Study: EVALUATE S/P  \par \par \par \par \par \par \par \par \par \par EXAM: CT BRAIN \par \par \par PROCEDURE DATE: 05/14/2019 \par \par \par \par INTERPRETATION: INDICATIONS: Status post 3/20/2019 intracranial hemorrhage \par follow-up \par \par TECHNIQUE: Serial axial images were obtained from the skull base to the \par vertex without intravenous contrast. \par \par COMPARISON EXAMINATION: 3/22/2019 \par \par FINDINGS: \par VENTRICLES AND SULCI: Normal. \par INTRA-AXIAL: Right PCA infarct as identified on the patient's prior \par 3/22/2019. There is resolution of previously seen acute hemorrhage in this \par region. Ex vacuo dilatation of the atria of the right lateral ventricle is \par recognized. Old left cerebellar infarct is again seen as seen previously. \par White matter lucency in the right temporal lobe again identified. Old right \par lentiform nucleus lacunar infarct. Left thalamic lacunar as seen on the \par prior as well. Right frontal lucency in the white matter. Resolution of \par previously identified hemorrhage in the high left frontal vertex region now \par with only lucency remaining. Microvascular ischemic changes involving the \par periventricular and subcortical white matter \par EXTRA-AXIAL: No mass or collection is seen. \par VISUALIZED SINUSES: Clear. \par VISUALIZED MASTOIDS: Clear. \par CALVARIUM: Normal. \par MISCELLANEOUS: None. \par \par IMPRESSION: Resolution of previously seen hemorrhage in association with \par the prior right PCA territory infarct. Resolution of hemorrhage seen at the \par left frontal high vertex region. No new areas of pathology. Residual now \par older appearing right PCA territory infarct. Old left cerebellar infarct. \par Lacunar infarcts in the left thalamus and right lentiform nucleus. No new \par areas of infarct appreciated. \par \par \par \par \par \par \par \par \par FRANKLIN DAVIS M.D., ATTENDING RADIOLOGIST \par This document has been electronically signed. May 14 2019 3:10PM \par \par \par  \par

## 2019-06-10 ENCOUNTER — APPOINTMENT (OUTPATIENT)
Dept: NEUROLOGY | Facility: CLINIC | Age: 79
End: 2019-06-10

## 2019-06-19 ENCOUNTER — APPOINTMENT (OUTPATIENT)
Dept: NEUROLOGY | Facility: CLINIC | Age: 79
End: 2019-06-19
Payer: MEDICARE

## 2019-06-19 PROCEDURE — 93886 INTRACRANIAL COMPLETE STUDY: CPT

## 2019-06-19 PROCEDURE — 93892 TCD EMBOLI DETECT W/O INJ: CPT

## 2019-06-19 PROCEDURE — 93880 EXTRACRANIAL BILAT STUDY: CPT

## 2019-07-05 ENCOUNTER — RECORD ABSTRACTING (OUTPATIENT)
Age: 79
End: 2019-07-05

## 2019-07-08 ENCOUNTER — APPOINTMENT (OUTPATIENT)
Dept: NEUROLOGY | Facility: CLINIC | Age: 79
End: 2019-07-08
Payer: MEDICARE

## 2019-07-08 VITALS
SYSTOLIC BLOOD PRESSURE: 135 MMHG | DIASTOLIC BLOOD PRESSURE: 74 MMHG | HEIGHT: 68 IN | BODY MASS INDEX: 33.95 KG/M2 | HEART RATE: 84 BPM | WEIGHT: 224 LBS

## 2019-07-08 DIAGNOSIS — I48.91 UNSPECIFIED ATRIAL FIBRILLATION: ICD-10-CM

## 2019-07-08 DIAGNOSIS — E78.5 HYPERLIPIDEMIA, UNSPECIFIED: ICD-10-CM

## 2019-07-08 DIAGNOSIS — I10 ESSENTIAL (PRIMARY) HYPERTENSION: ICD-10-CM

## 2019-07-08 DIAGNOSIS — I61.9 NONTRAUMATIC INTRACEREBRAL HEMORRHAGE, UNSPECIFIED: ICD-10-CM

## 2019-07-08 DIAGNOSIS — E11.9 TYPE 2 DIABETES MELLITUS W/OUT COMPLICATIONS: ICD-10-CM

## 2019-07-08 DIAGNOSIS — Z86.73 PERSONAL HISTORY OF TRANSIENT ISCHEMIC ATTACK (TIA), AND CEREBRAL INFARCTION W/OUT RESIDUAL DEFICITS: ICD-10-CM

## 2019-07-08 PROCEDURE — 99214 OFFICE O/P EST MOD 30 MIN: CPT

## 2019-07-18 ENCOUNTER — FORM ENCOUNTER (OUTPATIENT)
Age: 79
End: 2019-07-18

## 2019-07-19 ENCOUNTER — OUTPATIENT (OUTPATIENT)
Dept: OUTPATIENT SERVICES | Facility: HOSPITAL | Age: 79
LOS: 1 days | End: 2019-07-19
Payer: MEDICARE

## 2019-07-19 ENCOUNTER — APPOINTMENT (OUTPATIENT)
Dept: MRI IMAGING | Facility: CLINIC | Age: 79
End: 2019-07-19
Payer: MEDICARE

## 2019-07-19 DIAGNOSIS — Z00.8 ENCOUNTER FOR OTHER GENERAL EXAMINATION: ICD-10-CM

## 2019-07-19 PROCEDURE — 70544 MR ANGIOGRAPHY HEAD W/O DYE: CPT

## 2019-07-19 PROCEDURE — 70551 MRI BRAIN STEM W/O DYE: CPT | Mod: 26

## 2019-07-19 PROCEDURE — 70551 MRI BRAIN STEM W/O DYE: CPT

## 2019-07-22 ENCOUNTER — OTHER (OUTPATIENT)
Age: 79
End: 2019-07-22

## 2019-07-23 ENCOUNTER — OTHER (OUTPATIENT)
Age: 79
End: 2019-07-23

## 2019-09-19 ENCOUNTER — APPOINTMENT (OUTPATIENT)
Dept: NEUROLOGY | Facility: CLINIC | Age: 79
End: 2019-09-19

## 2020-01-10 ENCOUNTER — APPOINTMENT (OUTPATIENT)
Dept: NEUROLOGY | Facility: CLINIC | Age: 80
End: 2020-01-10

## 2020-07-06 NOTE — PHYSICAL THERAPY INITIAL EVALUATION ADULT - ASSISTIVE DEVICE FOR TRANSFER: SIT/STAND, REHAB EVAL
Referred by: Carlos Bruner MD; Medical Diagnosis (from order):    Diagnosis Information      Diagnosis    724.2 (ICD-9-CM) - M54.5 (ICD-10-CM) - Low back pain                Physical Therapy -  Daily Treatment Note    Visit:  2     SUBJECTIVE                                                                                                             Patient was really sore last time from the new exercise in his right quad. His pain is a 1/10 today. He has not been having much change in his symptoms yet. He has good days and bad days. Today is a better day for him. Patient has not had any changes in the the tingling in his legs.    Functional Change: No functional change.       Pain / Symptoms:  Pain rating (out of 10): Current: 1     OBJECTIVE                                                                                                                          TREATMENT                                                                                                                initial evaluation completed  Therapeutic Exercise:  -Hip flexor stretch 2 x 30 seconds  -Bracing with marching x 20 reps  -Bracing with SLR x 5 reps each leg  -Dead bug x 10 reps  -Bridges x 10 reps    Verbal Cues/Manual Cues during exercises, to correct technique and quality of movement. Instructed pt in TE purpose, benefits and risks; to facilitate decrease pain , increase range of motion, decrease muscular tension and increase strength.   Patient has agreed to exercises this visit.   Manual Therapy:  -Therapist assisted lumbar paraspinal and Quadratus Lumborum  for lumbar spine with patient supine  -Psoas and iliacus trigger point release with patient supine.   -STM and trigger point release to bilateral lumbar paraspinals and bilateral Quadratus Lumborum.     Ed pt in MT purpose,benefits and risks; to facilitate decrease pain , increase range of motion, decrease muscular tension and decrease myofacial tension.  Ed pt that after  techniques they may have some muscle soreness that lasts for the next 24-48 hours and possible bruising for the next 2 weeks with instrument assisted soft tissue mobilization and Cupping and to call with questions.  Ed pt to use ice/heat and drink more water for any residual discomfort they may experience.  Pt has agreed to techniques.     Un-attended Electrical Stimulation (28112/): Interferential Current  Location: lumbar spine  Position: prone  Pulse Rate:  Hz  Intensity: patient tolerance  In conjunction with: moist hot pack  Duration: 15 minutes  Results: decreased pain  Reaction: no adverse reaction to treatment    Skilled input: verbal instruction/cues, as detailed above and tactile instruction/cues    Writer verbally educated and received verbal consent for hand placement, positioning of patient, and techniques to be performed today from patient for therapist position for techniques, hand placement and palpation for techniques, clothing adjustments for techniques and modality application as described above and how they are pertinent to the patient's plan of care.    Home Exercise Program: (*above indicates provided as part of home exercise program)   Access Code: [email or web address]   URL: [email or web address]   Date: 07/06/2020   Prepared by: Jose Landis      Exercises Supine Lower Trunk Rotation - 1 minute hold - 1x daily - 7x weekly   Hip Flexor Stretch at Edge of Bed - 2 reps - 30 seconds hold - 1x daily - 7x weekly   Seated Quadratus Lumborum Stretch with Arm Overhead - 10 reps - 3 sets - 1x daily - 7x weekly   Sciatic nerve flossing - 10 reps - 5 seconds hold - 1x daily - 7x weekly   Hooklying TrA Bracing with Alternating Marching - 10 reps - 3 sets - 1x daily - 7x weekly   Supine Bridge - 10 reps - 3 sets - 1x daily - 7x weekly         ASSESSMENT                                                                                                             Patient was having a good day  today and therefore it was difficult to determine the effectiveness of the iliacus and psoas trigger point releases. Patient tolerated the increase in core strengthening well and was able to hold the core contraction during exercises while still breathing. Patient will likely be able to progress beyond supine next therapy session.     Pain/symptoms after session: 1  Patient Education:   Results of above outlined education: Verbalizes understanding and Demonstrates understanding     PLAN                                                                                                                             Suggestions for next session as indicated: Progress per plan of care. Continue to progress core stabilization exercises as tolerated. Decrease Quadratus Lumborum and hip flexor tightness. Improve gluteal and back extensor strength.        Procedures and total treatment time documented Time Entry flowsheet.     rolling walker

## 2022-09-21 NOTE — PHYSICAL THERAPY INITIAL EVALUATION ADULT - ACTIVE RANGE OF MOTION EXAMINATION, REHAB EVAL
Quality 431: Preventive Care And Screening: Unhealthy Alcohol Use - Screening: Patient screened for unhealthy alcohol use using a single question and scores less than 2 times per year
Quality 110: Preventive Care And Screening: Influenza Immunization: Influenza Immunization previously received during influenza season
Detail Level: Detailed
Quality 226: Preventive Care And Screening: Tobacco Use: Screening And Cessation Intervention: Patient screened for tobacco use and is an ex/non-smoker
Quality 130: Documentation Of Current Medications In The Medical Record: Current Medications Documented
Quality 431: Preventive Care And Screening: Unhealthy Alcohol Use - Screening: Patient not identified as an unhealthy alcohol user when screened for unhealthy alcohol use using a systematic screening method
no Active ROM deficits were identified

## 2022-11-07 NOTE — H&P ADULT - NEGATIVE GASTROINTESTINAL SYMPTOMS
Next dose of Tylenol will be on or after ___9pm________ ,today/tonight and every 6 hours afterwards for pain management, do not take any Tylenol containing products until this time. Your first dose of Tylenol was given at ____3:05pm_______. Do not exceed more than 4000mg of Tylenol in one 24 hour setting. no diarrhea/no vomiting/no melena/no change in bowel habits/no nausea/no constipation/no abdominal pain/no hematochezia

## 2023-02-23 NOTE — ED PROVIDER NOTE - CONSULTATIONS
HOME INSTRUCTION SHEET  Procedure/Condition: Cataract    Discharge Medications:   Regular Tylenol as directed on the bottle if needed.  Begin eye drops today at 12 pm, 4 pm, and 8 pm. Use one drop of each the Moxifloxacin/Vigamox and Prednisolone at each of these times today.   Starting tomorrow, use one drop of each the Moxifloxacin/Vigamox and Prednisolone to operative eye three times a day UNTIL GONE and one drop of the Prolensa once per day until gone (at least 2 weeks).   Wait 5 minutes between each drop.   May use drops in any order.   Shake the Prednisolone well before each use.  If using Glaucoma drops, please continue to use them.  Resume Home medications, no changes have been made.    Activity:   1.     Rest today.  Do not do any heavy lifting or strenuous activity for one week.   2.     Do not work or be in a marty or dirty place for one week.   3.     No swimming or hot tub use for one month.    4.     Do not drive a motor vehicle or operate machinery or appliances, make any important  decisions or drink alcohol for the next 24 hours.     5.     If there are any activities that you are unsure about check with your doctor.   6.     A responsible adult needs to oversee your care today.  Diet:  You may eat your usual diet.   Dressing/Incision:  Remove eye shield in four hours: 12 pm  Wear your glasses during the day and your eye shield at bedtime for one week.  Special Instructions:  DO NOT get your eye wet for one week.  You can bathe and shampoo your hair, but do not get water in your eye.     3.   Dark glasses are for comfort only (if light bothers your eye). Wear them as needed.  4.   If you use glaucoma drops continue to use them, but do not use any over the counter eye drops.   5.   No eye makeup for one week after surgery.  6.   It is normal for your eye to feel scratchy, burn and vision may be blurry today. You may put a cool washcloth around the eye to help with discomfort, itching or burning.  7.    GOOD HANDWASHING PRIOR TO INSTILLING YOUR EYE DROPS.   Call Your Doctor if You Have Any of The Followin.     Excessive pain not controlled by pain medication.   2.     Decreased vision, eyes are dry and scratchy.   3.     Temperature above 101 degrees.   You will next see your physician at their office.  Call for an appointment if needed at the number listed below.  The [facility] staff will call you in the next week  to see how you are doing.  If you need to speak to your Doctor, call his/her office number as listed below.  You can also feel free to contact the surgery center between the hours of 6:00 AM and 4:30 PM Monday-Thursday at [phone] with your questions/concerns regarding your instructions.      Physician:  Kaz Ragsdale M.D.    Office Address:  [address] [place] [address]  Office Phone: [phone]      It has been our privilege to take care of you today.  Our goal has been to make sure you and your family always felt confident in your care while you were here today. If at any time after you leave that questions, concerns, or worries should arise do not hesitate to contact us. It is always important that we treat you with the courtesy and respect that you deserve.  Thank you for choosing [facility] for you your care!   additional...

## 2025-04-09 NOTE — ED PROVIDER NOTE - MDM ORDERS SUBMITTED SELECTION
EKG/Imaging Studies/Labs
No current N/V/D/C reported. Per chart, last BM 4/08. Bowel regimen: dulcolax, miralax, senna.
